# Patient Record
Sex: MALE | Race: WHITE
[De-identification: names, ages, dates, MRNs, and addresses within clinical notes are randomized per-mention and may not be internally consistent; named-entity substitution may affect disease eponyms.]

---

## 2018-03-01 ENCOUNTER — HOSPITAL ENCOUNTER (INPATIENT)
Dept: HOSPITAL 74 - JER | Age: 83
LOS: 5 days | Discharge: HOME | DRG: 638 | End: 2018-03-06
Attending: INTERNAL MEDICINE | Admitting: INTERNAL MEDICINE
Payer: COMMERCIAL

## 2018-03-01 VITALS — BODY MASS INDEX: 40.5 KG/M2

## 2018-03-01 DIAGNOSIS — Z95.1: ICD-10-CM

## 2018-03-01 DIAGNOSIS — Z95.5: ICD-10-CM

## 2018-03-01 DIAGNOSIS — N40.0: ICD-10-CM

## 2018-03-01 DIAGNOSIS — Z96.652: ICD-10-CM

## 2018-03-01 DIAGNOSIS — E11.628: Primary | ICD-10-CM

## 2018-03-01 DIAGNOSIS — E83.39: ICD-10-CM

## 2018-03-01 DIAGNOSIS — M77.52: ICD-10-CM

## 2018-03-01 DIAGNOSIS — I25.10: ICD-10-CM

## 2018-03-01 DIAGNOSIS — L03.032: ICD-10-CM

## 2018-03-01 DIAGNOSIS — E87.6: ICD-10-CM

## 2018-03-01 DIAGNOSIS — E87.2: ICD-10-CM

## 2018-03-01 DIAGNOSIS — M19.072: ICD-10-CM

## 2018-03-01 DIAGNOSIS — I10: ICD-10-CM

## 2018-03-01 LAB
ALBUMIN SERPL-MCNC: 2.9 G/DL (ref 3.4–5)
ALP SERPL-CCNC: 61 U/L (ref 45–117)
ALT SERPL-CCNC: < 6 U/L (ref 12–78)
ANION GAP SERPL CALC-SCNC: 8 MMOL/L (ref 8–16)
APPEARANCE UR: CLEAR
AST SERPL-CCNC: 13 U/L (ref 15–37)
BASOPHILS # BLD: 0.9 % (ref 0–2)
BILIRUB SERPL-MCNC: 0.4 MG/DL (ref 0.2–1)
BILIRUB UR STRIP.AUTO-MCNC: NEGATIVE MG/DL
BUN SERPL-MCNC: 19 MG/DL (ref 7–18)
CALCIUM SERPL-MCNC: 7.8 MG/DL (ref 8.5–10.1)
CHLORIDE SERPL-SCNC: 104 MMOL/L (ref 98–107)
CO2 SERPL-SCNC: 29 MMOL/L (ref 21–32)
COLOR UR: (no result)
CREAT SERPL-MCNC: 1.3 MG/DL (ref 0.7–1.3)
DEPRECATED RDW RBC AUTO: 14.1 % (ref 11.9–15.9)
EOSINOPHIL # BLD: 2.3 % (ref 0–4.5)
GLUCOSE SERPL-MCNC: 105 MG/DL (ref 74–106)
HCT VFR BLD CALC: 35.2 % (ref 35.4–49)
HGB BLD-MCNC: 12.1 GM/DL (ref 11.7–16.9)
INR BLD: 1.43 (ref 0.82–1.09)
KETONES UR QL STRIP: (no result)
LEUKOCYTE ESTERASE UR QL STRIP.AUTO: NEGATIVE
LYMPHOCYTES # BLD: 24.5 % (ref 8–40)
MCH RBC QN AUTO: 30 PG (ref 25.7–33.7)
MCHC RBC AUTO-ENTMCNC: 34.3 G/DL (ref 32–35.9)
MCV RBC: 87.4 FL (ref 80–96)
MONOCYTES # BLD AUTO: 10.5 % (ref 3.8–10.2)
NEUTROPHILS # BLD: 61.8 % (ref 42.8–82.8)
NITRITE UR QL STRIP: NEGATIVE
PH UR: 5 [PH] (ref 5–8)
PLATELET # BLD AUTO: 215 K/MM3 (ref 134–434)
PMV BLD: 8.4 FL (ref 7.5–11.1)
POTASSIUM SERPLBLD-SCNC: 3.5 MMOL/L (ref 3.5–5.1)
PROT SERPL-MCNC: 6 G/DL (ref 6.4–8.2)
PROT UR QL STRIP: NEGATIVE
PROT UR QL STRIP: NEGATIVE
PT PNL PPP: 16.2 SEC (ref 9.98–11.88)
RBC # BLD AUTO: 4.03 M/MM3 (ref 4–5.6)
RBC # UR STRIP: NEGATIVE /UL
SODIUM SERPL-SCNC: 141 MMOL/L (ref 136–145)
SP GR UR: 1.01 (ref 1–1.03)
UROBILINOGEN UR STRIP-MCNC: NEGATIVE MG/DL (ref 0.2–1)
WBC # BLD AUTO: 5.9 K/MM3 (ref 4–10)

## 2018-03-01 PROCEDURE — G0480 DRUG TEST DEF 1-7 CLASSES: HCPCS

## 2018-03-01 NOTE — PDOC
*Physical Exam





- Vital Signs


 Last Vital Signs











Temp Pulse Resp BP Pulse Ox


 


 97.2 F L  80   20   106/72   97 


 


 03/01/18 21:53  03/01/18 22:56  03/01/18 22:56  03/01/18 22:56  03/01/18 22:56














ED Treatment Course





- LABORATORY


CBC & Chemistry Diagram: 


 03/01/18 15:45





 03/01/18 15:45





- ADDITIONAL ORDERS


Additional order review: 


 Laboratory  Results











  03/01/18 03/01/18 03/01/18





  22:54 21:30 19:50


 


PT with INR    16.20 H


 


INR    1.43 H


 


Sodium   


 


Potassium   


 


Chloride   


 


Carbon Dioxide   


 


Anion Gap   


 


BUN   


 


Creatinine   


 


Creat Clearance w eGFR   


 


Random Glucose   


 


Lactic Acid  2.0  


 


Calcium   


 


Total Bilirubin   


 


AST   


 


ALT   


 


Alkaline Phosphatase   


 


Total Protein   


 


Albumin   


 


Urine Color   Ltyellow 


 


Urine Appearance   Clear 


 


Urine pH   5.0 


 


Ur Specific Gravity   1.013 


 


Urine Protein   Negative 


 


Urine Glucose (UA)   Negative 


 


Urine Ketones   Trace H 


 


Urine Blood   Negative 


 


Urine Nitrite   Negative 


 


Urine Bilirubin   Negative 


 


Urine Urobilinogen   Negative 


 


Ur Leukocyte Esterase   Negative 


 


Blood Type   


 


Antibody Screen   














  03/01/18 03/01/18 03/01/18





  15:45 15:45 15:45


 


PT with INR   


 


INR   


 


Sodium    141


 


Potassium    3.5  D


 


Chloride    104


 


Carbon Dioxide    29


 


Anion Gap    8


 


BUN    19 H D


 


Creatinine    1.3


 


Creat Clearance w eGFR    53.12


 


Random Glucose    105


 


Lactic Acid   3.1 H* 


 


Calcium    7.8 L


 


Total Bilirubin    0.4


 


AST    13 L D


 


ALT    < 6 L D


 


Alkaline Phosphatase    61


 


Total Protein    6.0 L


 


Albumin    2.9 L D


 


Urine Color   


 


Urine Appearance   


 


Urine pH   


 


Ur Specific Gravity   


 


Urine Protein   


 


Urine Glucose (UA)   


 


Urine Ketones   


 


Urine Blood   


 


Urine Nitrite   


 


Urine Bilirubin   


 


Urine Urobilinogen   


 


Ur Leukocyte Esterase   


 


Blood Type  B POSITIVE  


 


Antibody Screen  Negative  








 











  03/01/18





  15:45


 


RBC  4.03


 


MCV  87.4


 


MCHC  34.3


 


RDW  14.1


 


MPV  8.4


 


Neutrophils %  61.8


 


Lymphocytes %  24.5


 


Monocytes %  10.5 H


 


Eosinophils %  2.3


 


Basophils %  0.9














- RADIOLOGY


Radiology Studies Ordered: 














 Category Date Time Status


 


 FOOT-LEFT [RAD] Stat Radiology  03/01/18 22:19 Taken














- Medications


Given in the ED: 


ED Medications














Discontinued Medications














Generic Name Dose Route Start Last Admin





  Trade Name Freq  PRN Reason Stop Dose Admin


 


Vancomycin HCl 1,000 mg/  250 mls @ 250 mls/hr  03/01/18 21:09  03/01/18 21:45





  Dextrose  IVPB  03/01/18 22:08  250 mls/hr





  ONCE ONE   Administration





  Protocol   


 


Piperacillin/Tazobactam/Dextrose  3.375 gm  03/01/18 21:09  03/01/18 21:30





  Zosyn 3.375gm Ivpb (Premix)  IVPB  03/01/18 21:10  3.375 gm





  ONCE ONE   Administration














*DC/Admit/Observation/Transfer


Diagnosis at time of Disposition: 


 Failure of outpatient treatment





Cellulitis


Qualifiers:


 Site of cellulitis: extremity Site of cellulitis of extremity: toe Laterality: 

right Qualified Code(s): L03.031 - Cellulitis of right toe








- Discharge Dispostion


Condition at time of disposition: Stable


Admit: Yes





- Referrals


Referrals: 


Pito Brooks MD [Primary Care Provider] - 





- Patient Instructions





- Post Discharge Activity

## 2018-03-01 NOTE — PDOC
Rapid Medical Evaluation


Time Seen by Provider: 03/01/18 18:46


Medical Evaluation: 


 Allergies











Allergy/AdvReac Type Severity Reaction Status Date / Time


 


No Known Allergies Allergy   Verified 03/01/18 18:46











03/01/18 18:46


Pt c/o: rt toe cellulitis. sent by pcp for iv abx, hx diabetes


Pt on brief exam: + erythema to rt 2nd toe


Pt ordered for : iv, septic w/u


Pt to proceed to the ED





**Discharge Disposition





- Diagnosis


 Cellulitis








- Referrals





- Patient Instructions





- Post Discharge Activity

## 2018-03-01 NOTE — PN
Teaching Attending Note


Name of Resident: Saúl Pradhan





ATTENDING PHYSICIAN STATEMENT





I saw and evaluated the patient.


Chart, data, imaging reviewed. 


I reviewed the resident's note and discussed the case with the resident.


I agree with the resident's findings and plan as documented.








SUBJECTIVE:





82 year-old male with history of left knee replacement, DM, HTN, BPH, CAD s/p 

CABG sent to hospital by podiatrist for left send toe erythema and concern for 

cellulitis and to evaluate for OM. Patient was receiving a 10 day course of 

unknown PO antibiotics for cellulitis of medial right foot. Left second toe 

erythema began about 3 days ago, associated with some pain, difficulty with 

ambulation. 





OBJECTIVE:


 Last Vital Signs











Temp Pulse Resp BP Pulse Ox


 


 97.2 F L  80   20   106/72   97 


 


 03/01/18 21:53  03/01/18 22:56  03/01/18 22:56  03/01/18 22:56  03/01/18 22:56








general- aaox3, comfortable, nad 


heent- no scleral pallor or injection 


neck-supple 


cv-s1+s2+ RRR 


chest- cta b/l, no rales, rhonchi, or wheezing 


abdomen- soft, nt, BS+ 


ext- left second toe erythematous, mildly warm to touch, nontender, no ulcers 

seen on feet.  





 Abnormal Lab Results











  03/01/18 03/01/18 03/01/18





  15:45 15:45 15:45


 


Hct  35.2 L D  


 


Monocytes %  10.5 H  


 


PT with INR   


 


INR   


 


BUN   19 H D 


 


Lactic Acid    3.1 H*


 


Calcium   7.8 L 


 


AST   13 L D 


 


ALT   < 6 L D 


 


Total Protein   6.0 L 


 


Albumin   2.9 L D 


 


Urine Ketones   














  03/01/18 03/01/18





  19:50 21:30


 


Hct  


 


Monocytes %  


 


PT with INR  16.20 H 


 


INR  1.43 H 


 


BUN  


 


Lactic Acid  


 


Calcium  


 


AST  


 


ALT  


 


Total Protein  


 


Albumin  


 


Urine Ketones   Trace H








ASSESSMENT AND PLAN:


#Left second toe cellulitis with lactic acidosis and no systemic symptoms. 

Should evaluate for possible underlying OM, although less likely. Received 

vancomycin and pip/tazo in ER. 


-blood cultures x2 


-IVF hydration 


-ESR, CRP 


-repeat lactate 


-xray of left foot to evaluate for periosteal elevation 


-pain control 


-ID evaluation 


-vancomycin and pip/tazo 





-continue home medications for chronic medical problems 





-heparin sc for DVT ppx

## 2018-03-01 NOTE — PDOC
History of Present Illness





- General


Chief Complaint: Wound Infection


Stated Complaint: PCP ADMIT/CELLULITIS


Time Seen by Provider: 03/01/18 18:46


History Source: Patient, Family, Primary Care Provider


Exam Limitations: No Limitations





- History of Present Illness


Initial Comments: 





03/01/18 20:21


Patient is an 20-year-old male with history of left knee replacement, diabetes, 

hypertension, BPH, cellulitis, CAD, status post CABG, referred here for 

treatment of cellulitis to the right second toe by Dr. Larry, podiatry. 

Patient was sent for admission for IV antibiotics. He has had this redness on 

his second left toe and has been treated with antibiotics for 1 month with no 

improvement.  She has no complaints of nausea, vomiting, fever, chills, 

diarrhea.








PMD: Dr. Long


PMHX:  as above


PSHX:  left knee replacement 


PSOCHX:  Negative cig, drugs, EtOH


ALL:  NKDA








GENERAL/CONSTITUTIONAL: [No fever or chills. No weakness. No weight change.]


HEAD, EYES, EARS, NOSE AND THROAT: [No change in vision. No ear pain or 

discharge. No sore throat.]


CARDIOVASCULAR: [No chest pain or shortness of breath.]


RESPIRATORY: [No cough, wheezing, or hemoptysis.]


GASTROINTESTINAL: [No nausea, vomiting, diarrhea or constipation. No rectal 

bleeding.]


GENITOURINARY: [No dysuria, frequency, or change in urination.]


MUSCULOSKELETAL: [No joint or muscle swelling or pain. No neck or back pain.]


SKIN AND BREASTS: (+) cellulitis, (-) easy bruising.]


NEUROLOGIC: [No headache, vertigo, loss of consciousness, or loss of sensation.]


PSYCHIATRIC: [No depression or anxiety.]


ENDOCRINE: [No increased thirst. No abnormal weight change.]


HEMATOLOGIC/LYMPHATIC: [No anemia, easy bleeding, or history of blood clots.]


ALLERGIC/IMMUNOLOGIC: [No hives or skin allergy. No latex allergy.]





GENERAL: [The patient is awake, alert, and fully oriented, in no acute distress.

]


HEAD: [Normal with no signs of trauma.]


EYES: [Pupils equal, round and reactive to light, extraocular movements intact, 

sclera anicteric, conjunctiva clear.]


ENT: [Ears normal, nares patent, oropharynx clear without exudates.  Moist 

mucous membranes.]


NECK: [Normal range of motion, supple without lymphadenopathy, JVD, or masses.]


LUNGS: [Breath sounds equal, clear to auscultation bilaterally.  No wheezes, 

and no crackles.]


HEART: [Regular rate and rhythm, normal S1 and S2 without murmur, rub.]


ABDOMEN: [Soft, nontender, normoactive bowel sounds.  No guarding, no rebound.  

No masses.]


EXTREMITIES: [Normal range of motion, no edema.  No clubbing or cyanosis. No 

cords, erythema, or tenderness.]


NEUROLOGICAL: [Cranial nerves II through XII grossly intact.  Normal speech, 

normal gait.]


PSYCH: [Normal mood, normal affect.]


SKIN: [Warm, Dry, normal turgor, (+) tenderness, erythema to the left 2nd toe











Past History





- Past Medical History


Allergies/Adverse Reactions: 


 Allergies











Allergy/AdvReac Type Severity Reaction Status Date / Time


 


No Known Allergies Allergy   Verified 03/01/18 18:46











Home Medications: 


Ambulatory Orders





Furosemide [Lasix -] 100 mg PO DAILY 04/15/15 


Metoprolol Succinate [Toprol Xl -] 25 mg PO DAILY 04/15/15 


Potassium Chloride 20 meq PO DAILY 04/15/15 


Tamsulosin HCl 0.4 mg PO DAILY 04/15/15 


Aspirin 81 mg PO DAILY 03/01/18 


Atorvastatin Ca [Lipitor] 60 mg PO DAILY 03/01/18 


Carbidopa/Levodopa *Cr* 50/200 [Sinemet *Cr* 50/200 -] 1 tab PO DAILY 03/01/18 


Cyanocobalamin (Vitamin B-12) [Vitamin B-12] 1,000 mcg PO DAILY 03/01/18 


Metformin HCl 500 mg PO TID 03/01/18 


Torsemide 60 mg PO DAILY 03/01/18 








Anemia: No


Asthma: No


Cancer: No


Cardiac Disorders: Yes (CAD)


CVA: No


COPD: No


CHF: No


Dementia: No


Diabetes: No


GI Disorders: No


 Disorders: Yes (BPH)


HTN: Yes


Hypercholesterolemia: Yes


Liver Disease: No


Seizures: No


Thyroid Disease: No





- Surgical History


Cardiac Surgery: Yes (CABG 5/2010)





- Immunization History


Immunization Up to Date: Yes





- Suicide/Smoking/Psychosocial Hx


Smoking History: Never smoked


Hx Alcohol Use: No


Drug/Substance Use Hx: No


Substance Use Type: None


Hx Substance Use Treatment: No





*Physical Exam





- Vital Signs


 Last Vital Signs











Temp Pulse Resp BP Pulse Ox


 


 97.5 F L  95 H  18   120/65   98 


 


 03/01/18 18:46  03/01/18 18:46  03/01/18 18:46  03/01/18 18:46  03/01/18 18:46














ED Treatment Course





- LABORATORY


CBC & Chemistry Diagram: 


 03/01/18 15:45





 03/01/18 15:45





- ADDITIONAL ORDERS


Additional order review: 


 











  03/01/18





  15:45


 


RBC  4.03


 


MCV  87.4


 


MCHC  34.3


 


RDW  14.1


 


MPV  8.4


 


Neutrophils %  61.8


 


Lymphocytes %  24.5


 


Monocytes %  10.5 H


 


Eosinophils %  2.3


 


Basophils %  0.9














- RADIOLOGY


Radiology Studies Ordered: 














 Category Date Time Status


 


 FOOT-RIGHT [RAD] Stat Radiology  03/01/18 19:57 Ordered














Medical Decision Making





- Medical Decision Making





03/01/18 20:31


Patient is an 20-year-old male with history of left knee replacement, diabetes, 

hypertension, BPH, cellulitis, CAD, status post CABG, referred here for 

treatment of cellulitis to the right second toe by Dr. Larry, podiatry after 

treatment with antibx for 1 month.  Patient has failed po antibx





labs, xray toe


IV antibx 


admit 





03/01/18 23:24


xray foot left 2nd toe neg for osteo


cxr cardiomegaly, sternotomy wire 





labs reviewed lactic 3.1 given IVF 30cc/hr


zosyn and Vancomycin given 


repeat lactic





ekg SR rate 76, PVC, rbbb, 





d/w with hosptialist will admit











*DC/Admit/Observation/Transfer


Diagnosis at time of Disposition: 


 Failure of outpatient treatment





Cellulitis


Qualifiers:


 Site of cellulitis: extremity Site of cellulitis of extremity: toe Laterality: 

right Qualified Code(s): L03.031 - Cellulitis of right toe








- Discharge Dispostion


Condition at time of disposition: Stable


Decision to Admit order Date/Time: 





03/01/18 23:35


to medicine 








- Referrals


Referrals: 


Pito Brooks MD [Primary Care Provider] - 





- Patient Instructions





- Post Discharge Activity

## 2018-03-01 NOTE — HP
CHIEF COMPLAINT:


Left foot swelling





PCP:


Dr. Mims





HISTORY OF PRESENT ILLNESS:


The patient is an 81 yo m w/ PMH DM, HTN, CAD s/p CABG x5 sent by his 

podiatrist (Dr. Larry) to the ED for further evaluation of swelling in his 

left toe. Patient states that approx 2 weeks ago, he developed swelling and 

pain in his RIGHT foot. He went to his podiatrist, who prescribed him an 

unknown antibiotic for 10 days. The patient completed 7 days of this antibiotic 

with resolution of the pain and swelling. Today, the patient's podiatrist 

noticed swelling and erythema of his LEFT second toe and sent him to the ED for 

evaluation. Patient states that the swelling, erythema and pain in his LEFT 2nd 

toe developed suddenly approx. 3 days ago and got progressively worse, 

eventually interfering with his walking. Patient and patient's wife check his 

feet daily and states that there was no wounds prior to presentation. Patient 

denies trauma to the area. Patient denies fevers, chills, chest pain, abdominal 

pain. 





ER course was notable for:


(1) Lactic acidosis 3.1


(2) potassium 3.5


(3)





Recent Travel:


none





PAST MEDICAL HISTORY:


DM


HTN


CAD





PAST SURGICAL HISTORY:


Left Total knee replacement





Social History:


Smoking: denies


Alcohol: denies


Drugs: denies 





Family History:


non-contributory





Allergies





No Known Allergies Allergy (Verified 03/01/18 18:46)


 








HOME MEDICATIONS:


 Home Medications











 Medication  Instructions  Recorded


 


Furosemide [Lasix -] 100 mg PO DAILY 04/15/15


 


Metoprolol Succinate [Toprol Xl -] 25 mg PO DAILY 04/15/15


 


Potassium Chloride 20 meq PO DAILY 04/15/15


 


Tamsulosin HCl 0.4 mg PO DAILY 04/15/15


 


Aspirin 81 mg PO DAILY 03/01/18


 


Atorvastatin Ca [Lipitor] 60 mg PO DAILY 03/01/18


 


Carbidopa/Levodopa *Cr* 50/200 1 tab PO DAILY 03/01/18





[Sinemet *Cr* 50/200 -]  


 


Cyanocobalamin (Vitamin B-12) 1,000 mcg PO DAILY 03/01/18





[Vitamin B-12]  


 


Metformin HCl 500 mg PO TID 03/01/18


 


Torsemide 60 mg PO DAILY 03/01/18








REVIEW OF SYSTEMS


CONSTITUTIONAL: 


Absent:  fever, chills, diaphoresis, generalized weakness, malaise, loss of 

appetite, weight change


HEENT: 


Absent:  rhinorrhea, nasal congestion, throat pain, throat swelling, difficulty 

swallowing, mouth swelling, ear pain, eye pain, visual changes


CARDIOVASCULAR: 


Absent: chest pain, syncope, palpitations, irregular heart rate, lightheadedness

, peripheral edema


RESPIRATORY: 


Absent: cough, shortness of breath, dyspnea with exertion, orthopnea, wheezing, 

stridor, hemoptysis


GASTROINTESTINAL:


Absent: abdominal pain, abdominal distension, nausea, vomiting, diarrhea, 

constipation, melena, hematochezia


GENITOURINARY: 


Absent: dysuria, frequency, urgency, hesitancy, hematuria, flank pain, genital 

pain


MUSCULOSKELETAL: 


Absent: myalgia, arthralgia, back pain, neck pain


SKIN: 


Absent: rash, itching, pallor


HEMATOLOGIC/IMMUNOLOGIC: 


Absent: easy bleeding, easy bruising, lymphadenopathy, frequent infections


ENDOCRINE:


Absent: unexplained weight gain, unexplained weight loss, heat intolerance, 

cold intolerance


NEUROLOGIC: 


Absent: headache, focal weakness or paresthesias, dizziness, unsteady gait, 

seizure, mental status changes, bladder or bowel incontinence


PSYCHIATRIC: 


Absent: anxiety, depression, suicidal or homicidal ideation, hallucinations.








PHYSICAL EXAMINATION


 Vital Signs - 24 hr











  03/01/18 03/01/18 03/01/18





  18:46 21:53 22:56


 


Temperature 97.5 F L 97.2 F L 


 


Pulse Rate 95 H  


 


Pulse Rate [  70 80





Apical]   


 


Respiratory 18 20 20





Rate   


 


Blood Pressure 120/65  


 


Blood Pressure  84/57 106/72





[Right Arm]   


 


O2 Sat by Pulse 98 97 97





Oximetry (%)   











GENERAL: Awake, alert, and fully oriented, in no acute distress.


HEAD: Normal with no signs of trauma.


EYES: Pupils equal, round and reactive to light, extraocular movements intact, 

sclera anicteric, conjunctiva clear. No lid lag.


EARS, NOSE, THROAT: Ears normal, nares patent, oropharynx clear without 

exudates. Moist mucous membranes.


NECK: Normal range of motion, supple without lymphadenopathy, JVD, or masses.


LUNGS: Breath sounds equal, clear to auscultation bilaterally. No wheezes, and 

no crackles. No accessory muscle use.


HEART: Regular rate and rhythm, normal S1 and S2 without murmur, rub or gallop.


ABDOMEN: Soft, nontender, not distended, normoactive bowel sounds, no guarding, 

no rebound, no masses.  No hepatomegaly or  splenomegaly. 


LOWER EXTREMITIES: 2+ pulses, warm, well-perfused. No calf tenderness. No 

peripheral edema. 


NEUROLOGICAL:  Cranial nerves II-X intact. Normal speech. Normal gait.


PSYCHIATRIC: Cooperative. Good eye contact. Appropriate mood and affect.


SKIN: Warm, dry, normal turgor, no rashes or lesions noted, normal capillary 

refill. There is an area of erythema and swelling on the left second toe. area 

is warm and tender to palpation or manipulation of the toe. 


 Laboratory Results - last 24 hr











  03/01/18 03/01/18 03/01/18





  15:45 15:45 15:45


 


WBC  5.9  


 


RBC  4.03  


 


Hgb  12.1  D  


 


Hct  35.2 L D  


 


MCV  87.4  


 


MCH  30.0  


 


MCHC  34.3  


 


RDW  14.1  


 


Plt Count  215  D  


 


MPV  8.4  


 


Neutrophils %  61.8  


 


Lymphocytes %  24.5  


 


Monocytes %  10.5 H  


 


Eosinophils %  2.3  


 


Basophils %  0.9  


 


PT with INR   


 


INR   


 


Sodium   141 


 


Potassium   3.5  D 


 


Chloride   104 


 


Carbon Dioxide   29 


 


Anion Gap   8 


 


BUN   19 H D 


 


Creatinine   1.3 


 


Creat Clearance w eGFR   53.12 


 


Random Glucose   105 


 


Lactic Acid    3.1 H*


 


Calcium   7.8 L 


 


Total Bilirubin   0.4 


 


AST   13 L D 


 


ALT   < 6 L D 


 


Alkaline Phosphatase   61 


 


Total Protein   6.0 L 


 


Albumin   2.9 L D 


 


Urine Color   


 


Urine Appearance   


 


Urine pH   


 


Ur Specific Gravity   


 


Urine Protein   


 


Urine Glucose (UA)   


 


Urine Ketones   


 


Urine Blood   


 


Urine Nitrite   


 


Urine Bilirubin   


 


Urine Urobilinogen   


 


Ur Leukocyte Esterase   


 


Blood Type   


 


Antibody Screen   














  03/01/18 03/01/18 03/01/18





  15:45 19:50 21:30


 


WBC   


 


RBC   


 


Hgb   


 


Hct   


 


MCV   


 


MCH   


 


MCHC   


 


RDW   


 


Plt Count   


 


MPV   


 


Neutrophils %   


 


Lymphocytes %   


 


Monocytes %   


 


Eosinophils %   


 


Basophils %   


 


PT with INR   16.20 H 


 


INR   1.43 H 


 


Sodium   


 


Potassium   


 


Chloride   


 


Carbon Dioxide   


 


Anion Gap   


 


BUN   


 


Creatinine   


 


Creat Clearance w eGFR   


 


Random Glucose   


 


Lactic Acid   


 


Calcium   


 


Total Bilirubin   


 


AST   


 


ALT   


 


Alkaline Phosphatase   


 


Total Protein   


 


Albumin   


 


Urine Color    Ltyellow


 


Urine Appearance    Clear


 


Urine pH    5.0


 


Ur Specific Gravity    1.013


 


Urine Protein    Negative


 


Urine Glucose (UA)    Negative


 


Urine Ketones    Trace H


 


Urine Blood    Negative


 


Urine Nitrite    Negative


 


Urine Bilirubin    Negative


 


Urine Urobilinogen    Negative


 


Ur Leukocyte Esterase    Negative


 


Blood Type  B POSITIVE  


 


Antibody Screen  Negative  














  03/01/18





  22:54


 


WBC 


 


RBC 


 


Hgb 


 


Hct 


 


MCV 


 


MCH 


 


MCHC 


 


RDW 


 


Plt Count 


 


MPV 


 


Neutrophils % 


 


Lymphocytes % 


 


Monocytes % 


 


Eosinophils % 


 


Basophils % 


 


PT with INR 


 


INR 


 


Sodium 


 


Potassium 


 


Chloride 


 


Carbon Dioxide 


 


Anion Gap 


 


BUN 


 


Creatinine 


 


Creat Clearance w eGFR 


 


Random Glucose 


 


Lactic Acid  2.0


 


Calcium 


 


Total Bilirubin 


 


AST 


 


ALT 


 


Alkaline Phosphatase 


 


Total Protein 


 


Albumin 


 


Urine Color 


 


Urine Appearance 


 


Urine pH 


 


Ur Specific Gravity 


 


Urine Protein 


 


Urine Glucose (UA) 


 


Urine Ketones 


 


Urine Blood 


 


Urine Nitrite 


 


Urine Bilirubin 


 


Urine Urobilinogen 


 


Ur Leukocyte Esterase 


 


Blood Type 


 


Antibody Screen 











ASSESSMENT/PLAN:


The patient is an 81 yo m w/ PMH DM , HTN who is being admitted for evaluation 

of left second toe cellulitis.





#Left second toe cellulitis r/o osteomyelitis


   -s/p vanc/zosyn in ED, will continue


   -ID consult


   -f/u XR of left foot


   -ESR, CRP w/ AM labs


   -lactic acidosis to 3.1, resolved on repeat


   -blood and urine culture pending


   


#Mild hypokalemia


   -3.5


   -patient on supplementation as outpatient


   -40meq now


   -resume home replacement 


   -trend in AM


   


#diabetes


   -BGM ACHS


   -ISS ACHS


   -HbA1C





#HTN


   -resume home meds


   


#FEN


   -no fluids indicated


   -monitor lytes


   -diabetic diet





#Prophylaxis


   -Hep SQ TID





#dispo


   -admit to med surg





Visit type





- Emergency Visit


Emergency Visit: Yes


ED Registration Date: 03/02/18


Care time: The patient presented to the Emergency Department on the above date 

and was hospitalized for further evaluation of their emergent condition.





- New Patient


This patient is new to me today: Yes


Date on this admission: 03/02/18





- Critical Care


Critical Care patient: No





Hospitalist Screening





- Colonoscopy Questionnaire


Colonoscopy Questionnaire: 





Colonoscopy Questionnaire








-   Patient:


50 - 75 years old and never had a screening colonoscopy: Unknown


History of colon or rectal polyps, or CA: Unknown


History of IBD, Crohn's disease or UC: Unknown


History of abdominal radiation therapy as a child: Unknown





-   Relative:


1 with colon or rectal CA, or polyps at age 60 or younger: Unknown


Colon or rectal CA diagnosed at age 45 or younger: Unknown


Multiple relatives with colon or rectal CA: Unknown





-   Outcome:


Screening Result: Negative Screen

## 2018-03-01 NOTE — PDOC
*Physical Exam





- Vital Signs


 Last Vital Signs











Temp Pulse Resp BP Pulse Ox


 


 97.5 F L  95 H  18   120/65   98 


 


 03/01/18 18:46  03/01/18 18:46  03/01/18 18:46  03/01/18 18:46  03/01/18 18:46














ED Treatment Course





- LABORATORY


CBC & Chemistry Diagram: 


 03/01/18 15:45





 03/01/18 15:45





- ADDITIONAL ORDERS


Additional order review: 


 Laboratory  Results











  03/01/18 03/01/18 03/01/18





  19:50 15:45 15:45


 


PT with INR  16.20 H  


 


INR  1.43 H  


 


Sodium    141


 


Potassium    3.5  D


 


Chloride    104


 


Carbon Dioxide    29


 


Anion Gap    8


 


BUN    19 H D


 


Creatinine    1.3


 


Creat Clearance w eGFR    53.12


 


Random Glucose    105


 


Lactic Acid   3.1 H* 


 


Calcium    7.8 L


 


Total Bilirubin    0.4


 


AST    13 L D


 


ALT    < 6 L D


 


Alkaline Phosphatase    61


 


Total Protein    6.0 L


 


Albumin    2.9 L D








 











  03/01/18





  15:45


 


RBC  4.03


 


MCV  87.4


 


MCHC  34.3


 


RDW  14.1


 


MPV  8.4


 


Neutrophils %  61.8


 


Lymphocytes %  24.5


 


Monocytes %  10.5 H


 


Eosinophils %  2.3


 


Basophils %  0.9














Medical Decision Making





- Medical Decision Making





03/01/18 21:21


The patient was seen and evaluated in conjunction with SUSI Shah under my 

direct supervision, ancillary studies were reviewed.  I independently 

interviewed and evaluated the patient and I agree with the plan as outlined by 

SUSI Wagner. 





*DC/Admit/Observation/Transfer


Diagnosis at time of Disposition: 


Cellulitis


Qualifiers:


 Site of cellulitis: extremity Site of cellulitis of extremity: toe Laterality: 

right Qualified Code(s): L03.031 - Cellulitis of right toe








- Referrals


Referrals: 


Pito Brooks MD [Primary Care Provider] - 





- Patient Instructions





- Post Discharge Activity

## 2018-03-02 LAB
ALBUMIN SERPL-MCNC: 2.7 G/DL (ref 3.4–5)
ALP SERPL-CCNC: 47 U/L (ref 45–117)
ALT SERPL-CCNC: 10 U/L (ref 12–78)
ANION GAP SERPL CALC-SCNC: 6 MMOL/L (ref 8–16)
AST SERPL-CCNC: 12 U/L (ref 15–37)
BASOPHILS # BLD: 0.7 % (ref 0–2)
BILIRUB SERPL-MCNC: 0.6 MG/DL (ref 0.2–1)
BUN SERPL-MCNC: 14 MG/DL (ref 7–18)
CALCIUM SERPL-MCNC: 7.5 MG/DL (ref 8.5–10.1)
CHLORIDE SERPL-SCNC: 107 MMOL/L (ref 98–107)
CO2 SERPL-SCNC: 29 MMOL/L (ref 21–32)
CREAT SERPL-MCNC: 0.9 MG/DL (ref 0.7–1.3)
DEPRECATED RDW RBC AUTO: 13.9 % (ref 11.9–15.9)
EOSINOPHIL # BLD: 2.5 % (ref 0–4.5)
GLUCOSE SERPL-MCNC: 90 MG/DL (ref 74–106)
HCT VFR BLD CALC: 34.1 % (ref 35.4–49)
HGB BLD-MCNC: 11.7 GM/DL (ref 11.7–16.9)
INR BLD: 1.49 (ref 0.82–1.09)
LYMPHOCYTES # BLD: 23.9 % (ref 8–40)
MAGNESIUM SERPL-MCNC: 1.8 MG/DL (ref 1.8–2.4)
MCH RBC QN AUTO: 29.9 PG (ref 25.7–33.7)
MCHC RBC AUTO-ENTMCNC: 34.3 G/DL (ref 32–35.9)
MCV RBC: 87.2 FL (ref 80–96)
MONOCYTES # BLD AUTO: 9.5 % (ref 3.8–10.2)
NEUTROPHILS # BLD: 63.4 % (ref 42.8–82.8)
PHOSPHATE SERPL-MCNC: 2.2 MG/DL (ref 2.5–4.9)
PLATELET # BLD AUTO: 187 K/MM3 (ref 134–434)
PMV BLD: 8 FL (ref 7.5–11.1)
POTASSIUM SERPLBLD-SCNC: 3.6 MMOL/L (ref 3.5–5.1)
PROT SERPL-MCNC: 5.3 G/DL (ref 6.4–8.2)
PT PNL PPP: 16.8 SEC (ref 9.98–11.88)
RBC # BLD AUTO: 3.91 M/MM3 (ref 4–5.6)
SODIUM SERPL-SCNC: 142 MMOL/L (ref 136–145)
WBC # BLD AUTO: 6.3 K/MM3 (ref 4–10)

## 2018-03-02 RX ADMIN — INSULIN ASPART SCH: 100 INJECTION, SOLUTION INTRAVENOUS; SUBCUTANEOUS at 11:38

## 2018-03-02 RX ADMIN — INSULIN ASPART SCH: 100 INJECTION, SOLUTION INTRAVENOUS; SUBCUTANEOUS at 06:07

## 2018-03-02 RX ADMIN — INSULIN ASPART SCH: 100 INJECTION, SOLUTION INTRAVENOUS; SUBCUTANEOUS at 22:10

## 2018-03-02 RX ADMIN — INSULIN ASPART SCH: 100 INJECTION, SOLUTION INTRAVENOUS; SUBCUTANEOUS at 17:17

## 2018-03-02 RX ADMIN — DONEPEZIL HYDROCHLORIDE SCH MG: 10 TABLET, FILM COATED ORAL at 17:53

## 2018-03-02 RX ADMIN — ASPIRIN 81 MG SCH MG: 81 TABLET ORAL at 09:58

## 2018-03-02 RX ADMIN — TAMSULOSIN HYDROCHLORIDE SCH MG: 0.4 CAPSULE ORAL at 09:58

## 2018-03-02 RX ADMIN — ATORVASTATIN CALCIUM SCH MG: 40 TABLET, FILM COATED ORAL at 22:10

## 2018-03-02 RX ADMIN — HEPARIN SODIUM SCH UNIT: 5000 INJECTION, SOLUTION INTRAVENOUS; SUBCUTANEOUS at 05:30

## 2018-03-02 RX ADMIN — HEPARIN SODIUM SCH UNIT: 5000 INJECTION, SOLUTION INTRAVENOUS; SUBCUTANEOUS at 13:47

## 2018-03-02 RX ADMIN — POTASSIUM CHLORIDE SCH MEQ: 1500 TABLET, EXTENDED RELEASE ORAL at 09:59

## 2018-03-02 RX ADMIN — HEPARIN SODIUM SCH UNIT: 5000 INJECTION, SOLUTION INTRAVENOUS; SUBCUTANEOUS at 22:10

## 2018-03-02 RX ADMIN — PIPERACILLIN SODIUM,TAZOBACTAM SODIUM SCH MLS/HR: 3; .375 INJECTION, POWDER, FOR SOLUTION INTRAVENOUS at 17:52

## 2018-03-02 NOTE — EKG
Test Reason : 

Blood Pressure : ***/*** mmHG

Vent. Rate : 076 BPM     Atrial Rate : 076 BPM

   P-R Int : 176 ms          QRS Dur : 152 ms

    QT Int : 506 ms       P-R-T Axes : 015 -15 -18 degrees

   QTc Int : 569 ms

 

SINUS RHYTHM WITH FREQUENT PREMATURE VENTRICULAR COMPLEXES

RIGHT BUNDLE BRANCH BLOCK

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

T WAVE ABNORMALITY, CONSIDER LATERAL ISCHEMIA

ABNORMAL ECG

WHEN COMPARED WITH ECG OF 25-MAR-2015 12:28,

PREMATURE VENTRICULAR COMPLEXES ARE NOW PRESENT

QT HAS LENGTHENED

Confirmed by BENJAMIN PRITCHETT MD ([id number]) on 3/2/2018 10:03:44 AM

 

Referred By:             Confirmed By:BENJAMIN PRITCHETT MD

## 2018-03-02 NOTE — PN
Physical Exam: 


SUBJECTIVE: Patient seen and examined.  Pt c/o pain to dorsum of Left foot.  

Otherwise, pt has no c/o.  Pt denies chest pain, sob, abdominal pain, fever, 

chills, nausea, vomiting, constipation, diarrhea. 





OBJECTIVE:





 Vital Signs











 Period  Temp  Pulse  Resp  BP Sys/Lowe  Pulse Ox


 


 Last 24 Hr  97.2 F-98.0 F  70-95  18-20  /55-74  97-99








GENERAL: The patient is awake, alert, and fully oriented, in no acute distress.


LUNGS: Breath sounds equal, clear to auscultation bilaterally, no wheezes, no 

crackles, no accessory muscle use. 


HEART: Regular rate and rhythm, S1, S2 without murmur, rub or gallop.


ABDOMEN: Soft, nontender, nondistended, normoactive bowel sounds, no guarding.


EXTREMITIES: 1+ jillian pulses, warm, well-perfused, no edema.  Left 2nd toe 

erythematous and swollen at Proximal Interphalangeal joint.  Dorsum of Left 

foot quite tender to palpation with 3cm possible abscess/circular swelling.


PSYCH: Normal mood, normal affect.





 Laboratory Results - last 24 hr











  03/01/18 03/01/18 03/01/18





  15:45 15:45 15:45


 


WBC  5.9  


 


RBC  4.03  


 


Hgb  12.1  D  


 


Hct  35.2 L D  


 


MCV  87.4  


 


MCH  30.0  


 


MCHC  34.3  


 


RDW  14.1  


 


Plt Count  215  D  


 


MPV  8.4  


 


Neutrophils %  61.8  


 


Lymphocytes %  24.5  


 


Monocytes %  10.5 H  


 


Eosinophils %  2.3  


 


Basophils %  0.9  


 


ESR   


 


PT with INR   


 


INR   


 


Sodium   141 


 


Potassium   3.5  D 


 


Chloride   104 


 


Carbon Dioxide   29 


 


Anion Gap   8 


 


BUN   19 H D 


 


Creatinine   1.3 


 


Creat Clearance w eGFR   53.12 


 


POC Glucometer   


 


Random Glucose   105 


 


Hemoglobin A1c %   


 


Lactic Acid    3.1 H*


 


Calcium   7.8 L 


 


Phosphorus   


 


Magnesium   


 


Total Bilirubin   0.4 


 


AST   13 L D 


 


ALT   < 6 L D 


 


Alkaline Phosphatase   61 


 


C-Reactive Protein   


 


Total Protein   6.0 L 


 


Albumin   2.9 L D 


 


Urine Color   


 


Urine Appearance   


 


Urine pH   


 


Ur Specific Gravity   


 


Urine Protein   


 


Urine Glucose (UA)   


 


Urine Ketones   


 


Urine Blood   


 


Urine Nitrite   


 


Urine Bilirubin   


 


Urine Urobilinogen   


 


Ur Leukocyte Esterase   


 


Blood Type   


 


Antibody Screen   














  03/01/18 03/01/18 03/01/18





  15:45 19:50 21:30


 


WBC   


 


RBC   


 


Hgb   


 


Hct   


 


MCV   


 


MCH   


 


MCHC   


 


RDW   


 


Plt Count   


 


MPV   


 


Neutrophils %   


 


Lymphocytes %   


 


Monocytes %   


 


Eosinophils %   


 


Basophils %   


 


ESR   


 


PT with INR   16.20 H 


 


INR   1.43 H 


 


Sodium   


 


Potassium   


 


Chloride   


 


Carbon Dioxide   


 


Anion Gap   


 


BUN   


 


Creatinine   


 


Creat Clearance w eGFR   


 


POC Glucometer   


 


Random Glucose   


 


Hemoglobin A1c %   


 


Lactic Acid   


 


Calcium   


 


Phosphorus   


 


Magnesium   


 


Total Bilirubin   


 


AST   


 


ALT   


 


Alkaline Phosphatase   


 


C-Reactive Protein   


 


Total Protein   


 


Albumin   


 


Urine Color    Ltyellow


 


Urine Appearance    Clear


 


Urine pH    5.0


 


Ur Specific Gravity    1.013


 


Urine Protein    Negative


 


Urine Glucose (UA)    Negative


 


Urine Ketones    Trace H


 


Urine Blood    Negative


 


Urine Nitrite    Negative


 


Urine Bilirubin    Negative


 


Urine Urobilinogen    Negative


 


Ur Leukocyte Esterase    Negative


 


Blood Type  B POSITIVE  


 


Antibody Screen  Negative  














  03/01/18 03/02/18 03/02/18





  22:54 05:25 06:40


 


WBC   


 


RBC   


 


Hgb   


 


Hct   


 


MCV   


 


MCH   


 


MCHC   


 


RDW   


 


Plt Count   


 


MPV   


 


Neutrophils %   


 


Lymphocytes %   


 


Monocytes %   


 


Eosinophils %   


 


Basophils %   


 


ESR   


 


PT with INR   


 


INR   


 


Sodium    142


 


Potassium    3.6


 


Chloride    107


 


Carbon Dioxide    29


 


Anion Gap    6 L


 


BUN    14  D


 


Creatinine    0.9  D


 


Creat Clearance w eGFR    > 60


 


POC Glucometer   90 


 


Random Glucose    90


 


Hemoglobin A1c %   


 


Lactic Acid  2.0  


 


Calcium    7.5 L


 


Phosphorus    2.2 L


 


Magnesium    1.8


 


Total Bilirubin    0.6  D


 


AST    12 L


 


ALT    10 L D


 


Alkaline Phosphatase    47  D


 


C-Reactive Protein    0.9 H


 


Total Protein    5.3 L


 


Albumin    2.7 L


 


Urine Color   


 


Urine Appearance   


 


Urine pH   


 


Ur Specific Gravity   


 


Urine Protein   


 


Urine Glucose (UA)   


 


Urine Ketones   


 


Urine Blood   


 


Urine Nitrite   


 


Urine Bilirubin   


 


Urine Urobilinogen   


 


Ur Leukocyte Esterase   


 


Blood Type   


 


Antibody Screen   














  03/02/18 03/02/18 03/02/18





  06:40 06:40 06:40


 


WBC    6.3


 


RBC    3.91 L


 


Hgb    11.7


 


Hct    34.1 L


 


MCV    87.2


 


MCH    29.9


 


MCHC    34.3


 


RDW    13.9


 


Plt Count    187


 


MPV    8.0


 


Neutrophils %    63.4


 


Lymphocytes %    23.9


 


Monocytes %    9.5


 


Eosinophils %    2.5


 


Basophils %    0.7


 


ESR  24 H  


 


PT with INR   


 


INR   


 


Sodium   


 


Potassium   


 


Chloride   


 


Carbon Dioxide   


 


Anion Gap   


 


BUN   


 


Creatinine   


 


Creat Clearance w eGFR   


 


POC Glucometer   


 


Random Glucose   


 


Hemoglobin A1c %   6.2 H 


 


Lactic Acid   


 


Calcium   


 


Phosphorus   


 


Magnesium   


 


Total Bilirubin   


 


AST   


 


ALT   


 


Alkaline Phosphatase   


 


C-Reactive Protein   


 


Total Protein   


 


Albumin   


 


Urine Color   


 


Urine Appearance   


 


Urine pH   


 


Ur Specific Gravity   


 


Urine Protein   


 


Urine Glucose (UA)   


 


Urine Ketones   


 


Urine Blood   


 


Urine Nitrite   


 


Urine Bilirubin   


 


Urine Urobilinogen   


 


Ur Leukocyte Esterase   


 


Blood Type   


 


Antibody Screen   














  03/02/18 03/02/18





  06:40 11:37


 


WBC  


 


RBC  


 


Hgb  


 


Hct  


 


MCV  


 


MCH  


 


MCHC  


 


RDW  


 


Plt Count  


 


MPV  


 


Neutrophils %  


 


Lymphocytes %  


 


Monocytes %  


 


Eosinophils %  


 


Basophils %  


 


ESR  


 


PT with INR  16.80 H 


 


INR  1.49 H 


 


Sodium  


 


Potassium  


 


Chloride  


 


Carbon Dioxide  


 


Anion Gap  


 


BUN  


 


Creatinine  


 


Creat Clearance w eGFR  


 


POC Glucometer   107


 


Random Glucose  


 


Hemoglobin A1c %  


 


Lactic Acid  


 


Calcium  


 


Phosphorus  


 


Magnesium  


 


Total Bilirubin  


 


AST  


 


ALT  


 


Alkaline Phosphatase  


 


C-Reactive Protein  


 


Total Protein  


 


Albumin  


 


Urine Color  


 


Urine Appearance  


 


Urine pH  


 


Ur Specific Gravity  


 


Urine Protein  


 


Urine Glucose (UA)  


 


Urine Ketones  


 


Urine Blood  


 


Urine Nitrite  


 


Urine Bilirubin  


 


Urine Urobilinogen  


 


Ur Leukocyte Esterase  


 


Blood Type  


 


Antibody Screen  








Active Medications











Generic Name Dose Route Start Last Admin





  Trade Name Freq  PRN Reason Stop Dose Admin


 


Aspirin  81 mg  03/02/18 10:00  03/02/18 09:58





  Asa -  PO   81 mg





  DAILY MITCHELL   Administration


 


Atorvastatin Calcium  60 mg  03/02/18 22:00  





  Lipitor -  PO   





  HS MITCHELL   


 


Carbidopa/Levodopa  1 combo  03/02/18 10:00  03/02/18 11:44





  Sinemet *Cr* 50/200 -  PO   1 combo





  DAILY MITCHELL   Administration


 


Heparin Sodium (Porcine)  5,000 unit  03/02/18 06:00  03/02/18 13:47





  Heparin -  SQ   5,000 unit





  TID MITCHELL   Administration


 


Potassium Phosphate 30 mm/  510 mls @ 62.5 mls/hr  03/02/18 08:45  03/02/18 10:

01





  Sodium Chloride  IVPB  03/02/18 16:54  62.5 mls/hr





  ONCE ONE   Administration


 


Vancomycin HCl 1,000 mg/  250 mls @ 250 mls/hr  03/02/18 14:30  





  Dextrose  IVPB   





  Q12H MITCHELL   





  Protocol   


 


Piperacillin Sod/Tazobactam  50 mls @ 100 mls/hr  03/02/18 18:00  





  Sod 3.375 gm/ Dextrose  IVPB   





  Q8H-IV MITCHELL   





  Protocol   


 


Insulin Aspart  1 vial  03/02/18 07:00  03/02/18 11:38





  Novolog Vial Sliding Scale -  SQ   Not Given





  [facility] MITCHELL   





  Protocol   


 


Metoprolol Succinate  25 mg  03/02/18 10:00  03/02/18 10:00





  Toprol Xl -  PO   25 mg





  DAILY MITCHELL   Administration


 


Potassium Chloride  20 meq  03/02/18 10:00  03/02/18 09:59





  K-Dur -  PO   20 meq





  DAILY MITCHELL   Administration


 


Tamsulosin HCl  0.4 mg  03/02/18 08:30  03/02/18 09:58





  Flomax -  PO   0.4 mg





  DAILY@0830 MITCHELL   Administration


 


Torsemide  60 mg  03/02/18 10:00  03/02/18 09:58





  Demadex -  PO   60 mg





  DAILY MITCHELL   Administration








IMAGING:


3/1/18 CXR -> no acute chest pathology


3/1/18 Left foot xray -> arthritic changes, no acute fracture or destructive 

bony process noted.





ASSESSMENT/PLAN:


82M with PMH of DM, htn, CAD, presents with swelling in dorsum of Left foot and 

to Left 2nd toe, admitted for Left diabetic foot.





# Left diabetic foot with Left 2nd toe cellulitis - ?abscess to Left 2nd toe and

/or to dorsum of Left foot


   - Podiatry Consult


   - Surgery Consult


   - ID (Dr. Haley) recs appreciated: empiric IV Vancomycin and IV Zosyn Day 2


   - f/u blood and urine cultures





# NIDDM


   - BGMs


   - SSI


   - hgba1c 6.2, suggesting DM is well controlled 


   - hold home med of Metformin





# CAD


   - continue home meds of ASA and Lipitor





# htn


   - continue home meds of Toprol XL and Torsemide





# hypophosphatemia


   - repleted with KPhos IVPB 30 mm





# FEN 


   - Fluids: po


   - Electrolytes: hypophosphatemia noted, continue to monitor


   - Nutrition: diabetic diet 





# Prophylaxis


   - DVT ppx with Heparin TID


   - deconditioning ppx with PT





Visit type





- Emergency Visit


Emergency Visit: Yes


ED Registration Date: 03/02/18


Care time: The patient presented to the Emergency Department on the above date 

and was hospitalized for further evaluation of their emergent condition.





- New Patient


This patient is new to me today: Yes


Date on this admission: 03/02/18





- Critical Care


Critical Care patient: No

## 2018-03-02 NOTE — PN
Progress Note (short form)





- Note


Progress Note: 





ID Consult dictated


Cellulitis L 2nd toe


? Soft tissue abscess, dorsum 2nd toe


Diabetes mellitus


Await c/s


Surgical evaluation


Empiric vancomycin/ zosyn

## 2018-03-02 NOTE — PN
Teaching Attending Note


Name of Resident: Celine Shay





ATTENDING PHYSICIAN STATEMENT


Time of evaluation: 10:20 AM


I saw and evaluated the patient.


I reviewed the resident's note and discussed the case with the resident.


I agree with the resident's findings and plan as documented.








SUBJECTIVE:


Patient seen and examined. still with left toe and foot pain, no other 

complaints, no new fevers, chills. 





OBJECTIVE:


 Vital Signs











 Period  Temp  Pulse  Resp  BP Sys/Lowe  Pulse Ox


 


 Last 24 Hr  97.2 F-98.0 F  70-95  18-20  /55-74  97-99








 Intake & Output











 02/27/18 02/28/18 03/01/18 03/02/18





 23:59 23:59 23:59 23:59


 


Intake Total   2300 700


 


Output Total   300 250


 


Balance   2000 450


 


Weight   237 lb 243 lb 8 oz








General: sitting in bed in no acute distress


Extremiteis: left second toe 1 cm circular red warm tendern swelling over dorsum

, Also 2 cm circular swelling with tenderness but no warmth or erythema over 

dorsum left foot, positive DP plulses. 


No swelling or erythema RLE





 Home Medication List











 Medication  Instructions  Recorded  Confirmed  Type


 


Furosemide [Lasix -] 100 mg PO DAILY 04/15/15 03/01/18 History


 


Metoprolol Succinate [Toprol Xl -] 25 mg PO DAILY 04/15/15 03/01/18 History


 


Potassium Chloride 20 meq PO DAILY 04/15/15 03/01/18 History


 


Tamsulosin HCl 0.4 mg PO DAILY 04/15/15 03/01/18 History


 


Aspirin 81 mg PO DAILY 03/01/18 03/01/18 History


 


Atorvastatin Ca [Lipitor] 60 mg PO DAILY 03/01/18 03/01/18 History


 


Carbidopa/Levodopa *Cr* 50/200 1 tab PO DAILY 03/01/18 03/01/18 History





[Sinemet *Cr* 50/200 -]    


 


Cyanocobalamin (Vitamin B-12) 1,000 mcg PO DAILY 03/01/18 03/01/18 History





[Vitamin B-12]    


 


Metformin HCl 500 mg PO TID 03/01/18 03/01/18 History


 


Torsemide 60 mg PO DAILY 03/01/18 03/01/18 History








 Active Medications











Generic Name Dose Route Start Last Admin





  Trade Name Freq  PRN Reason Stop Dose Admin


 


Aspirin  81 mg  03/02/18 10:00  03/02/18 09:58





  Asa -  PO   81 mg





  DAILY MITCHELL   Administration


 


Atorvastatin Calcium  60 mg  03/02/18 22:00  





  Lipitor -  PO   





  HS MITCHELL   


 


Carbidopa/Levodopa  1 combo  03/02/18 10:00  03/02/18 11:44





  Sinemet *Cr* 50/200 -  PO   1 combo





  DAILY MITCHELL   Administration


 


Heparin Sodium (Porcine)  5,000 unit  03/02/18 06:00  03/02/18 13:47





  Heparin -  SQ   5,000 unit





  TID MITCHELL   Administration


 


Potassium Phosphate 30 mm/  510 mls @ 62.5 mls/hr  03/02/18 08:45  03/02/18 10:

01





  Sodium Chloride  IVPB  03/02/18 16:54  62.5 mls/hr





  ONCE ONE   Administration


 


Vancomycin HCl 1,000 mg/  250 mls @ 250 mls/hr  03/02/18 14:30  





  Dextrose  IVPB   





  Q12H [facility]   





  Protocol   


 


Piperacillin Sod/Tazobactam  50 mls @ 100 mls/hr  03/02/18 18:00  





  Sod 3.375 gm/ Dextrose  IVPB   





  Q8H-IV [facility]   





  Protocol   


 


Insulin Aspart  1 vial  03/02/18 07:00  03/02/18 11:38





  Novolog Vial Sliding Scale -  SQ   Not Given





  [facility]   





  Protocol   


 


Metoprolol Succinate  25 mg  03/02/18 10:00  03/02/18 10:00





  Toprol Xl -  PO   25 mg





  DAILY MITCHELL   Administration


 


Potassium Chloride  20 meq  03/02/18 10:00  03/02/18 09:59





  K-Dur -  PO   20 meq





  DAILY MITCHELL   Administration


 


Tamsulosin HCl  0.4 mg  03/02/18 08:30  03/02/18 09:58





  Flomax -  PO   0.4 mg





  DAILY@0830 MITCHELL   Administration


 


Torsemide  60 mg  03/02/18 10:00  03/02/18 09:58





  Demadex -  PO   60 mg





  DAILY MITCHELL   Administration








 Laboratory Results - last 24 hr











  03/01/18 03/01/18 03/01/18





  15:45 15:45 15:45


 


WBC  5.9  


 


RBC  4.03  


 


Hgb  12.1  D  


 


Hct  35.2 L D  


 


MCV  87.4  


 


MCH  30.0  


 


MCHC  34.3  


 


RDW  14.1  


 


Plt Count  215  D  


 


MPV  8.4  


 


Neutrophils %  61.8  


 


Lymphocytes %  24.5  


 


Monocytes %  10.5 H  


 


Eosinophils %  2.3  


 


Basophils %  0.9  


 


ESR   


 


PT with INR   


 


INR   


 


Sodium   141 


 


Potassium   3.5  D 


 


Chloride   104 


 


Carbon Dioxide   29 


 


Anion Gap   8 


 


BUN   19 H D 


 


Creatinine   1.3 


 


Creat Clearance w eGFR   53.12 


 


POC Glucometer   


 


Random Glucose   105 


 


Hemoglobin A1c %   


 


Lactic Acid    3.1 H*


 


Calcium   7.8 L 


 


Phosphorus   


 


Magnesium   


 


Total Bilirubin   0.4 


 


AST   13 L D 


 


ALT   < 6 L D 


 


Alkaline Phosphatase   61 


 


C-Reactive Protein   


 


Total Protein   6.0 L 


 


Albumin   2.9 L D 


 


Urine Color   


 


Urine Appearance   


 


Urine pH   


 


Ur Specific Gravity   


 


Urine Protein   


 


Urine Glucose (UA)   


 


Urine Ketones   


 


Urine Blood   


 


Urine Nitrite   


 


Urine Bilirubin   


 


Urine Urobilinogen   


 


Ur Leukocyte Esterase   


 


Blood Type   


 


Antibody Screen   














  03/01/18 03/01/18 03/01/18





  15:45 19:50 21:30


 


WBC   


 


RBC   


 


Hgb   


 


Hct   


 


MCV   


 


MCH   


 


MCHC   


 


RDW   


 


Plt Count   


 


MPV   


 


Neutrophils %   


 


Lymphocytes %   


 


Monocytes %   


 


Eosinophils %   


 


Basophils %   


 


ESR   


 


PT with INR   16.20 H 


 


INR   1.43 H 


 


Sodium   


 


Potassium   


 


Chloride   


 


Carbon Dioxide   


 


Anion Gap   


 


BUN   


 


Creatinine   


 


Creat Clearance w eGFR   


 


POC Glucometer   


 


Random Glucose   


 


Hemoglobin A1c %   


 


Lactic Acid   


 


Calcium   


 


Phosphorus   


 


Magnesium   


 


Total Bilirubin   


 


AST   


 


ALT   


 


Alkaline Phosphatase   


 


C-Reactive Protein   


 


Total Protein   


 


Albumin   


 


Urine Color    Ltyellow


 


Urine Appearance    Clear


 


Urine pH    5.0


 


Ur Specific Gravity    1.013


 


Urine Protein    Negative


 


Urine Glucose (UA)    Negative


 


Urine Ketones    Trace H


 


Urine Blood    Negative


 


Urine Nitrite    Negative


 


Urine Bilirubin    Negative


 


Urine Urobilinogen    Negative


 


Ur Leukocyte Esterase    Negative


 


Blood Type  B POSITIVE  


 


Antibody Screen  Negative  














  03/01/18 03/02/18 03/02/18





  22:54 05:25 06:40


 


WBC   


 


RBC   


 


Hgb   


 


Hct   


 


MCV   


 


MCH   


 


MCHC   


 


RDW   


 


Plt Count   


 


MPV   


 


Neutrophils %   


 


Lymphocytes %   


 


Monocytes %   


 


Eosinophils %   


 


Basophils %   


 


ESR   


 


PT with INR   


 


INR   


 


Sodium    142


 


Potassium    3.6


 


Chloride    107


 


Carbon Dioxide    29


 


Anion Gap    6 L


 


BUN    14  D


 


Creatinine    0.9  D


 


Creat Clearance w eGFR    > 60


 


POC Glucometer   90 


 


Random Glucose    90


 


Hemoglobin A1c %   


 


Lactic Acid  2.0  


 


Calcium    7.5 L


 


Phosphorus    2.2 L


 


Magnesium    1.8


 


Total Bilirubin    0.6  D


 


AST    12 L


 


ALT    10 L D


 


Alkaline Phosphatase    47  D


 


C-Reactive Protein    0.9 H


 


Total Protein    5.3 L


 


Albumin    2.7 L


 


Urine Color   


 


Urine Appearance   


 


Urine pH   


 


Ur Specific Gravity   


 


Urine Protein   


 


Urine Glucose (UA)   


 


Urine Ketones   


 


Urine Blood   


 


Urine Nitrite   


 


Urine Bilirubin   


 


Urine Urobilinogen   


 


Ur Leukocyte Esterase   


 


Blood Type   


 


Antibody Screen   














  03/02/18 03/02/18 03/02/18





  06:40 06:40 06:40


 


WBC    6.3


 


RBC    3.91 L


 


Hgb    11.7


 


Hct    34.1 L


 


MCV    87.2


 


MCH    29.9


 


MCHC    34.3


 


RDW    13.9


 


Plt Count    187


 


MPV    8.0


 


Neutrophils %    63.4


 


Lymphocytes %    23.9


 


Monocytes %    9.5


 


Eosinophils %    2.5


 


Basophils %    0.7


 


ESR  24 H  


 


PT with INR   


 


INR   


 


Sodium   


 


Potassium   


 


Chloride   


 


Carbon Dioxide   


 


Anion Gap   


 


BUN   


 


Creatinine   


 


Creat Clearance w eGFR   


 


POC Glucometer   


 


Random Glucose   


 


Hemoglobin A1c %   6.2 H 


 


Lactic Acid   


 


Calcium   


 


Phosphorus   


 


Magnesium   


 


Total Bilirubin   


 


AST   


 


ALT   


 


Alkaline Phosphatase   


 


C-Reactive Protein   


 


Total Protein   


 


Albumin   


 


Urine Color   


 


Urine Appearance   


 


Urine pH   


 


Ur Specific Gravity   


 


Urine Protein   


 


Urine Glucose (UA)   


 


Urine Ketones   


 


Urine Blood   


 


Urine Nitrite   


 


Urine Bilirubin   


 


Urine Urobilinogen   


 


Ur Leukocyte Esterase   


 


Blood Type   


 


Antibody Screen   














  03/02/18 03/02/18





  06:40 11:37


 


WBC  


 


RBC  


 


Hgb  


 


Hct  


 


MCV  


 


MCH  


 


MCHC  


 


RDW  


 


Plt Count  


 


MPV  


 


Neutrophils %  


 


Lymphocytes %  


 


Monocytes %  


 


Eosinophils %  


 


Basophils %  


 


ESR  


 


PT with INR  16.80 H 


 


INR  1.49 H 


 


Sodium  


 


Potassium  


 


Chloride  


 


Carbon Dioxide  


 


Anion Gap  


 


BUN  


 


Creatinine  


 


Creat Clearance w eGFR  


 


POC Glucometer   107


 


Random Glucose  


 


Hemoglobin A1c %  


 


Lactic Acid  


 


Calcium  


 


Phosphorus  


 


Magnesium  


 


Total Bilirubin  


 


AST  


 


ALT  


 


Alkaline Phosphatase  


 


C-Reactive Protein  


 


Total Protein  


 


Albumin  


 


Urine Color  


 


Urine Appearance  


 


Urine pH  


 


Ur Specific Gravity  


 


Urine Protein  


 


Urine Glucose (UA)  


 


Urine Ketones  


 


Urine Blood  


 


Urine Nitrite  


 


Urine Bilirubin  


 


Urine Urobilinogen  


 


Ur Leukocyte Esterase  


 


Blood Type  


 


Antibody Screen  














ASSESSMENT AND PLAN:


82 yom with HTN, DM, CAD s/p CABG/5 stents, ?CHF admitted with left diabetic 

foot





-Left diabetic foot with left second toe cellulitis+/- abscess and ?abscess of 

dorsum left foot


-Lactic acidosis, resolved


-CAD s/p CABG/PCI, ?CHF


-HTN


-NIDDM





Plan:


Zosyn/vancomycin. ID input appreciated. 


?abscess left second toe/dorsum left foot


Podiatry consult. 


Hold off additional IVF for now. lactic acidosis resolved. 


Torsemide, K, phos supplementation. 


Hold metformin, ISS, diabetic diet. 


Continue ASA/statin/metoprolol. 


DVTPPx


dispo pending surgical plans and antibiotic needs. 


Plan discussed with patient in detail, all questions answered.

## 2018-03-03 LAB
ANION GAP SERPL CALC-SCNC: 13 MMOL/L (ref 8–16)
BUN SERPL-MCNC: 9 MG/DL (ref 7–18)
CALCIUM SERPL-MCNC: 8.9 MG/DL (ref 8.5–10.1)
CHLORIDE SERPL-SCNC: 104 MMOL/L (ref 98–107)
CO2 SERPL-SCNC: 26 MMOL/L (ref 21–32)
CREAT SERPL-MCNC: 0.9 MG/DL (ref 0.7–1.3)
DEPRECATED RDW RBC AUTO: 13.9 % (ref 11.9–15.9)
GLUCOSE SERPL-MCNC: 111 MG/DL (ref 74–106)
HCT VFR BLD CALC: 36.8 % (ref 35.4–49)
HGB BLD-MCNC: 12.6 GM/DL (ref 11.7–16.9)
MAGNESIUM SERPL-MCNC: 2.3 MG/DL (ref 1.8–2.4)
MCH RBC QN AUTO: 29.9 PG (ref 25.7–33.7)
MCHC RBC AUTO-ENTMCNC: 34.3 G/DL (ref 32–35.9)
MCV RBC: 87 FL (ref 80–96)
PHOSPHATE SERPL-MCNC: 3.3 MG/DL (ref 2.5–4.9)
PLATELET # BLD AUTO: 205 K/MM3 (ref 134–434)
PMV BLD: 8.1 FL (ref 7.5–11.1)
POTASSIUM SERPLBLD-SCNC: 3.7 MMOL/L (ref 3.5–5.1)
RBC # BLD AUTO: 4.23 M/MM3 (ref 4–5.6)
SODIUM SERPL-SCNC: 143 MMOL/L (ref 136–145)
URATE SERPL-SCNC: 6 MG/DL (ref 2.6–7.2)
WBC # BLD AUTO: 5.4 K/MM3 (ref 4–10)

## 2018-03-03 RX ADMIN — ASPIRIN 81 MG SCH MG: 81 TABLET ORAL at 09:44

## 2018-03-03 RX ADMIN — ATORVASTATIN CALCIUM SCH MG: 40 TABLET, FILM COATED ORAL at 21:54

## 2018-03-03 RX ADMIN — TORSEMIDE SCH MG: 20 TABLET ORAL at 09:44

## 2018-03-03 RX ADMIN — DONEPEZIL HYDROCHLORIDE SCH MG: 10 TABLET, FILM COATED ORAL at 09:44

## 2018-03-03 RX ADMIN — VANCOMYCIN HYDROCHLORIDE SCH MLS/HR: 1 INJECTION, POWDER, LYOPHILIZED, FOR SOLUTION INTRAVENOUS at 14:03

## 2018-03-03 RX ADMIN — INSULIN ASPART SCH: 100 INJECTION, SOLUTION INTRAVENOUS; SUBCUTANEOUS at 21:55

## 2018-03-03 RX ADMIN — PIPERACILLIN SODIUM,TAZOBACTAM SODIUM SCH MLS/HR: 3; .375 INJECTION, POWDER, FOR SOLUTION INTRAVENOUS at 09:44

## 2018-03-03 RX ADMIN — HEPARIN SODIUM SCH UNIT: 5000 INJECTION, SOLUTION INTRAVENOUS; SUBCUTANEOUS at 14:03

## 2018-03-03 RX ADMIN — INSULIN ASPART SCH: 100 INJECTION, SOLUTION INTRAVENOUS; SUBCUTANEOUS at 06:32

## 2018-03-03 RX ADMIN — POTASSIUM CHLORIDE SCH MEQ: 1500 TABLET, EXTENDED RELEASE ORAL at 09:44

## 2018-03-03 RX ADMIN — HEPARIN SODIUM SCH UNIT: 5000 INJECTION, SOLUTION INTRAVENOUS; SUBCUTANEOUS at 06:37

## 2018-03-03 RX ADMIN — VANCOMYCIN HYDROCHLORIDE SCH MLS/HR: 1 INJECTION, POWDER, LYOPHILIZED, FOR SOLUTION INTRAVENOUS at 02:41

## 2018-03-03 RX ADMIN — ACETAMINOPHEN PRN MG: 325 TABLET ORAL at 14:06

## 2018-03-03 RX ADMIN — PIPERACILLIN SODIUM,TAZOBACTAM SODIUM SCH MLS/HR: 3; .375 INJECTION, POWDER, FOR SOLUTION INTRAVENOUS at 17:39

## 2018-03-03 RX ADMIN — PIPERACILLIN SODIUM,TAZOBACTAM SODIUM SCH MLS/HR: 3; .375 INJECTION, POWDER, FOR SOLUTION INTRAVENOUS at 01:45

## 2018-03-03 RX ADMIN — HEPARIN SODIUM SCH UNIT: 5000 INJECTION, SOLUTION INTRAVENOUS; SUBCUTANEOUS at 21:54

## 2018-03-03 RX ADMIN — ACETAMINOPHEN PRN MG: 325 TABLET ORAL at 22:33

## 2018-03-03 RX ADMIN — TAMSULOSIN HYDROCHLORIDE SCH MG: 0.4 CAPSULE ORAL at 08:15

## 2018-03-03 RX ADMIN — INSULIN ASPART SCH UNITS: 100 INJECTION, SOLUTION INTRAVENOUS; SUBCUTANEOUS at 16:56

## 2018-03-03 RX ADMIN — INSULIN ASPART SCH: 100 INJECTION, SOLUTION INTRAVENOUS; SUBCUTANEOUS at 11:43

## 2018-03-03 NOTE — CONS
DATE OF CONSULTATION:

 

DATE OF DICTATION:  03/02/2018

 

INFECTIOUS DISEASE CONSULTATION 

 

HISTORY OF PRESENT ILLNESS : An 82-year-old diabetic male evaluated for cellulitis of

the left foot.  The patient reports he had developed redness along the medial aspect

of the right foot about 10 days prior to admission.  He was seen by his podiatrist. 

He was prescribed an oral antibiotic which he took for 7 days.  Over the past 3 days

he developed worsening erythema, warmth, and swelling of the left 2nd toe.  He denies

any traumatic injury to his foot.  He denies prior history of serious soft tissue

infection requiring hospitalization or history of MRSA.  He denies any associated

fever or chills.  No reports of purulent drainage from the toe.

 

PAST MEDICAL HISTORY:  Positive for diabetes mellitus, hypertension, BPH, coronary

artery disease, COPD, obstructive sleep apnea, congestive heart failure,

osteoarthritis.

PAST SURGICAL HISTORY:  Status post left total knee replacement, coronary artery

bypass .  

 

ALLERGIES:  No known allergies.

 

MEDICATION:  Lasix, Toprol, tamsulosin, aspirin, Lipitor, Sinemet, metformin. 

 

SOCIAL HISTORY:  Lives at home with his wife.  Nonsmoker.  Nondrinker.

 

SYSTEMS REVIEW:  

Neurologic:  No loss of consciousness, seizure activity, or focal weakness.

Cardiac:  Negative chest pain or palpitations.  

Respiratory:  Negative cough or sputum production.  

Gastrointestinal:  Negative vomiting or diarrhea.  

Genitourinary:  Negative for urinary tract infection.  

 

LABORATORY DATA:  White count 6.3, hematocrit 34.1, platelet count 187, BUN 14,

creatinine 0.9, lactic acid 3.1, ESR 24, C-reactive protein 0.9.  Urine leukocyte

esterase negative.  Chest x-ray negative for fracture or dislocation.  

 

PHYSICAL EXAMINATION:

General:  He is awake and alert, in no acute distress.  

Vital signs:  Temperature 98.0, blood pressure 106/55, pulse 77 regular, respirations

20 per minute.  

HEENT:  Sclerae anicteric. 

Cardiovascular:  Heart sounds S1, S2. 

Respiratory:  Lungs clear.  

Abdomen:  Obese.  Soft.  Nontender.  

Extremities:  Negative for edema.  Right foot, no evidence of erythema, warmth, or

swelling.  Left foot, there is diffuse swelling of the left 2nd toe with a fluctuant

area of the mid dorsal aspect of the toe.  No expressible pus is attended to

palpation.  

 

IMPRESSION:

1.  Cellulitis of the left second toe.

2.  Possible soft tissue abscess left second toe.  

3.  Diabetes mellitus.  

 

Await cultures.  Obtain surgical evaluation.  Empiric antibiotic coverage with

vancomycin and Zosyn.  Local wound care.  Will follow. Thank you for the kind

referral.  

 

 

BENJAMIN MENA M.D.

 

[id number]

DD: 03/02/2018 13:29

DT: 03/03/2018 00:41

Job #:  [id number]

## 2018-03-03 NOTE — CONSULT
Consult - text type





- Consultation


Consultation Note: 





Podiatry Consultation:





Pleasant 82 year old DM M presents to ED, sent by podiatrist, for swelling/

redness/pain L 2nd digit.  Patient states he has never had this problem before.

  Denies F/V/N/C/SOB/CP.  Denies injury to the area.  Denies any open sores, 

drainage to the area.  Afebrile, VSS.





PMHx: DM, HTN, CAD s/p CABG x 5


Meds: noted


ALL: NKMA





YOLANDE:





L foot: pedal pulses 1/4, TG wnl, CFT brisk to all toes.  There is a rigid 

contracted 2nd hammer toe deformity with localized erythema and some fluctuance 

dorsal PIPJ.  There is tenderness on palpation and range of motion.  There is 

no open ulcer, no drainage, no streaking cellulitis, no signs of acute 

infection.  Epicritic sensation grossly intact.





WBC: 6.3


ESR: 23





Blood Cx: no growth





L foot XR: contracture 2nd digit, no evidence of osteomyelitis; exostosis 

dorsal midfoot





Imp: 82 year old DM M with likely L 2nd digit bursitis


1. IV abx per ID


2. Due to biomechanics of foot, often patients with contracted 2nd digit 

develop bursitis secondary to shoegear irritation which is likely the cause of 

the localized redness to the toe.  If there is concern, further imaging may be 

warranted, i.e. MRI.  Discussed with medical care team.


3. No acute intervention at this time.  Thanks for the consult.





ALICIA Holley DPM

## 2018-03-03 NOTE — PN
Teaching Attending Note


Name of Resident: Kwasi Sorensen





ATTENDING PHYSICIAN STATEMENT





I saw and evaluated the patient.


I reviewed the resident's note and discussed the case with the resident.


I agree with the resident's findings and plan as documented.








SUBJECTIVE:


Patient seen and examined, left foot pain with some improvement, no fevers/

chills or new concerns. 





OBJECTIVE:


 Vital Signs











 Period  Temp  Pulse  Resp  BP Sys/Lowe  Pulse Ox


 


 Last 24 Hr  97.7 F-98.4 F  70-80  20-20  115-131/58-70  98








 Intake & Output











 02/28/18 03/01/18 03/02/18 03/03/18





 23:59 23:59 23:59 23:59


 


Intake Total  2300 1000 


 


Output Total  300 250 


 


Balance  2000 750 


 


Weight  237 lb 243 lb 8 oz 238 lb 5 oz








General: sitting in bed in no acute distress


Extremities: left foot dorsum swelling with improved tenderness, left second 

toe with improved erythema, persistent swelling





 Home Medication List











 Medication  Instructions  Recorded  Confirmed  Type


 


Metoprolol Succinate [Toprol Xl -] 25 mg PO DAILY 04/15/15 03/01/18 History


 


Potassium Chloride 20 meq PO DAILY 04/15/15 03/01/18 History


 


Tamsulosin HCl 0.4 mg PO DAILY 04/15/15 03/01/18 History


 


Aspirin 81 mg PO DAILY 03/01/18 03/01/18 History


 


Atorvastatin Ca [Lipitor] 40 mg PO DAILY 03/01/18 03/02/18 History


 


Carbidopa/Levodopa *Cr* 50/200 1 tab PO TID 03/01/18 03/02/18 History





[Sinemet *Cr* 50/200 -]    


 


Metformin HCl 500 mg PO TID 03/01/18 03/01/18 History


 


Torsemide 20 mg PO DAILY 03/01/18 03/02/18 History


 


Donepezil HCl [Aricept] 10 mg PO DAILY 03/02/18 03/02/18 History








 Active Medications











Generic Name Dose Route Start Last Admin





  Trade Name Freq  PRN Reason Stop Dose Admin


 


Acetaminophen  650 mg  03/02/18 17:12  





  Tylenol -  PO   





  Q4H PRN   





  PAIN LEVEL 6-10   


 


Aspirin  81 mg  03/02/18 10:00  03/02/18 09:58





  Asa -  PO   81 mg





  DAILY [facility]CHELL   Administration


 


Atorvastatin Calcium  40 mg  03/02/18 22:00  03/02/18 22:10





  Lipitor -  PO   40 mg





  HS MITCHELL   Administration


 


Carbidopa/Levodopa  1 combo  03/02/18 22:00  03/03/18 06:37





  Sinemet *Cr* 50/200 -  PO   1 combo





  TID MITCHELL   Administration


 


Donepezil HCl  10 mg  03/02/18 15:45  03/02/18 17:53





  Aricept -  PO   10 mg





  DAILY [facility]LL   Administration


 


Heparin Sodium (Porcine)  5,000 unit  03/02/18 06:00  03/03/18 06:37





  Heparin -  SQ   5,000 unit





  TID MITCHELL   Administration


 


Vancomycin HCl 1,000 mg/  250 mls @ 250 mls/hr  03/02/18 14:30  03/03/18 02:41





  Dextrose  IVPB   250 mls/hr





  Q12H MITCHELL   Administration





  Protocol   


 


Piperacillin Sod/Tazobactam  50 mls @ 100 mls/hr  03/02/18 18:00  03/03/18 01:45





  Sod 3.375 gm/ Dextrose  IVPB   100 mls/hr





  Q8H-IV MITCHELL   Administration





  Protocol   


 


Insulin Aspart  1 vial  03/02/18 07:00  03/03/18 06:32





  Novolog Vial Sliding Scale -  SQ   Not Given





  ACHS MIT[facility]LL   





  Protocol   


 


Metoprolol Succinate  25 mg  03/02/18 10:00  03/02/18 10:00





  Toprol Xl -  PO   25 mg





  DAILY [facility]CHELL   Administration


 


Potassium Chloride  20 meq  03/02/18 10:00  03/02/18 09:59





  K-Dur -  PO   20 meq





  DAILY [facility]CHELL   Administration


 


Tamsulosin HCl  0.4 mg  03/02/18 08:30  03/03/18 08:15





  Flomax -  PO   0.4 mg





  DAILY@0830 MITCHELL   Administration


 


Torsemide  20 mg  03/02/18 15:34  





  Demadex -  PO   





  DAILY [facility]   








 Laboratory Results - last 24 hr











  03/02/18 03/02/18 03/02/18





  06:40 06:40 11:37


 


WBC   


 


RBC   


 


Hgb   


 


Hct   


 


MCV   


 


MCH   


 


MCHC   


 


RDW   


 


Plt Count   


 


MPV   


 


Sodium   


 


Potassium   


 


Chloride   


 


Carbon Dioxide   


 


Anion Gap   


 


BUN   


 


Creatinine   


 


POC Glucometer    107


 


Random Glucose   


 


Hemoglobin A1c %   6.2 H 


 


Uric Acid   


 


Calcium   


 


Phosphorus   


 


Magnesium   


 


C-Reactive Protein  0.9 H  














  03/02/18 03/02/18 03/03/18





  17:16 22:09 06:31


 


WBC   


 


RBC   


 


Hgb   


 


Hct   


 


MCV   


 


MCH   


 


MCHC   


 


RDW   


 


Plt Count   


 


MPV   


 


Sodium   


 


Potassium   


 


Chloride   


 


Carbon Dioxide   


 


Anion Gap   


 


BUN   


 


Creatinine   


 


POC Glucometer  125  110  115


 


Random Glucose   


 


Hemoglobin A1c %   


 


Uric Acid   


 


Calcium   


 


Phosphorus   


 


Magnesium   


 


C-Reactive Protein   














  03/03/18 03/03/18





  07:00 07:00


 


WBC  5.4 


 


RBC  4.23 


 


Hgb  12.6 


 


Hct  36.8 


 


MCV  87.0 


 


MCH  29.9 


 


MCHC  34.3 


 


RDW  13.9 


 


Plt Count  205 


 


MPV  8.1 


 


Sodium   143


 


Potassium   3.7


 


Chloride   104


 


Carbon Dioxide   26


 


Anion Gap   13


 


BUN   9  D


 


Creatinine   0.9


 


POC Glucometer  


 


Random Glucose   111 H D


 


Hemoglobin A1c %  


 


Uric Acid   6.0


 


Calcium   8.9


 


Phosphorus   3.3  D


 


Magnesium   2.3  D


 


C-Reactive Protein  








 Microbiology





03/01/18 21:30   Urine - Urine Clean Catch   Urine Culture - Final


03/01/18 15:45   Blood - Peripheral Venous   Blood Culture - Preliminary


                            NO GROWTH OBTAINED AFTER 24 HOURS, INCUBATION TO 

CONTINUE


                            FOR 4 DAYS.


03/01/18 15:45   Blood - Peripheral Venous   Blood Culture - Preliminary


                            NO GROWTH OBTAINED AFTER 24 HOURS, INCUBATION TO 

CONTINUE


                            FOR 4 DAYS.











ASSESSMENT AND PLAN:


82 yom with HTN, DM, CAD s/p CABG/5 stents, ?CHF admitted with left diabetic 

foot





-Left diabetic foot with left second toe cellulitis+/- abscess and ?abscess of 

dorsum left foot


-Lactic acidosis, resolved


-CAD s/p CABG/PCI, ?CHF


-HTN


-NIDDM





Plan:


Zosyn/vancomycin. ID input appreciated. 


Discussed with Dr. Holley, so plans for surgical intervention now. 


Left dorsum symptoms suspects from bony spur. 


Follow up MRI. 


Hold off additional IVF for now. lactic acidosis resolved. 


Torsemide, K, phos supplementation. 


Hold metformin, ISS, diabetic diet. 


Continue ASA/statin/metoprolol. 


DVTPPx


dispo pending surgical plans and antibiotic needs. 


Plan discussed with patient in detail, all questions answered.

## 2018-03-03 NOTE — PN
Physical Exam: 


SUBJECTIVE: Patient seen and examined


still complains of pain in his foot. 


states nothing change since yesterday 








OBJECTIVE:





 Vital Signs











 Period  Temp  Pulse  Resp  BP Sys/Lowe  Pulse Ox


 


 Last 24 Hr  97.7 F-98.4 F  70-80  20-20  115-131/58-70  98











GENERAL: The patient is awake, alert, and fully oriented, in no acute distress.


LUNGS: Breath sounds equal, clear to auscultation bilaterally, no wheezes, no 

crackles, no accessory muscle use. 


HEART: Regular rate and rhythm, S1, S2 without murmur, rub or gallop.


ABDOMEN: Soft, nontender, nondistended, normoactive bowel sounds, no guarding.


EXTREMITIES: 1+ jillian pulses, warm, well-perfused, no edema.  Left 2nd toe 

erythematous.  Dorsum of Left foot quite tender to palpation.


PSYCH: Normal mood, normal affect














 Laboratory Results - last 24 hr











  03/02/18 03/02/18 03/02/18





  06:40 11:37 17:16


 


WBC   


 


RBC   


 


Hgb   


 


Hct   


 


MCV   


 


MCH   


 


MCHC   


 


RDW   


 


Plt Count   


 


MPV   


 


Sodium   


 


Potassium   


 


Chloride   


 


Carbon Dioxide   


 


Anion Gap   


 


BUN   


 


Creatinine   


 


POC Glucometer   107  125


 


Random Glucose   


 


Uric Acid   


 


Calcium   


 


Phosphorus   


 


Magnesium   


 


C-Reactive Protein  0.9 H  














  03/02/18 03/03/18 03/03/18





  22:09 06:31 07:00


 


WBC    5.4


 


RBC    4.23


 


Hgb    12.6


 


Hct    36.8


 


MCV    87.0


 


MCH    29.9


 


MCHC    34.3


 


RDW    13.9


 


Plt Count    205


 


MPV    8.1


 


Sodium   


 


Potassium   


 


Chloride   


 


Carbon Dioxide   


 


Anion Gap   


 


BUN   


 


Creatinine   


 


POC Glucometer  110  115 


 


Random Glucose   


 


Uric Acid   


 


Calcium   


 


Phosphorus   


 


Magnesium   


 


C-Reactive Protein   














  03/03/18





  07:00


 


WBC 


 


RBC 


 


Hgb 


 


Hct 


 


MCV 


 


MCH 


 


MCHC 


 


RDW 


 


Plt Count 


 


MPV 


 


Sodium  143


 


Potassium  3.7


 


Chloride  104


 


Carbon Dioxide  26


 


Anion Gap  13


 


BUN  9  D


 


Creatinine  0.9


 


POC Glucometer 


 


Random Glucose  111 H D


 


Uric Acid  6.0


 


Calcium  8.9


 


Phosphorus  3.3  D


 


Magnesium  2.3  D


 


C-Reactive Protein 








Active Medications











Generic Name Dose Route Start Last Admin





  Trade Name Freq  PRN Reason Stop Dose Admin


 


Acetaminophen  650 mg  03/02/18 17:12  





  Tylenol -  PO   





  Q4H PRN   





  PAIN LEVEL 6-10   


 


Aspirin  81 mg  03/02/18 10:00  03/03/18 09:44





  Asa -  PO   81 mg





  DAILY MITCHELL   Administration


 


Atorvastatin Calcium  40 mg  03/02/18 22:00  03/02/18 22:10





  Lipitor -  PO   40 mg





  HS MITCHELL   Administration


 


Carbidopa/Levodopa  1 combo  03/02/18 22:00  03/03/18 06:37





  Sinemet *Cr* 50/200 -  PO   1 combo





  TID [facility]CHELL   Administration


 


Donepezil HCl  10 mg  03/02/18 15:45  03/03/18 09:44





  Aricept -  PO   10 mg





  DAILY [facility]CHELL   Administration


 


Heparin Sodium (Porcine)  5,000 unit  03/02/18 06:00  03/03/18 06:37





  Heparin -  SQ   5,000 unit





  TID MITCHELL   Administration


 


Vancomycin HCl 1,000 mg/  250 mls @ 250 mls/hr  03/02/18 14:30  03/03/18 02:41





  Dextrose  IVPB   250 mls/hr





  Q12H MITCHELL   Administration





  Protocol   


 


Piperacillin Sod/Tazobactam  50 mls @ 100 mls/hr  03/02/18 18:00  03/03/18 09:44





  Sod 3.375 gm/ Dextrose  IVPB   100 mls/hr





  Q8H-IV MITCHELL   Administration





  Protocol   


 


Insulin Aspart  1 vial  03/02/18 07:00  03/03/18 06:32





  Novolog Vial Sliding Scale -  SQ   Not Given





  ACHS MITCHELL   





  Protocol   


 


Metoprolol Succinate  25 mg  03/02/18 10:00  03/03/18 09:44





  Toprol Xl -  PO   25 mg





  DAILY MITCHELL   Administration


 


Potassium Chloride  20 meq  03/02/18 10:00  03/03/18 09:44





  K-Dur -  PO   20 meq





  DAILY MITCHELL   Administration


 


Tamsulosin HCl  0.4 mg  03/02/18 08:30  03/03/18 08:15





  Flomax -  PO   0.4 mg





  DAILY@0830 [facility]CHELL   Administration


 


Torsemide  20 mg  03/02/18 15:34  03/03/18 09:44





  Demadex -  PO   20 mg





  DAILY MITCHELL   Administration











ASSESSMENT/PLAN: 82M with PMH of DM, htn, CAD, presents with swelling in dorsum 

of Left foot and to Left 2nd toe, admitted for Left diabetic foot.





# Left diabetic foot with Left 2nd toe cellulitis - 


   continue antibiotics as per ID 


   Podiatry consult appreciated: pain and erythema can be due to bursitis and 

on dorsum of foot is bony spur


   MRI pending. 





# NIDDM


   - BGMs


   - hgba1c 6.2,


   - on insulin sliding scale. 


          - metformin on hold. 





# CAD


   - continue home meds of ASA and Lipitor





# htn


   - continue home meds of Toprol XL and Torsemide





# hypophosphatemia


   - improved phos 3.3





# FEN 


   - Fluids: po


   - Electrolytes: repeat tomorrow 


   - Nutrition: diabetic diet 





# Prophylaxis


   - DVT ppx with Heparin TID


   





dispo


: med surg








Visit type





- Emergency Visit


Emergency Visit: Yes


ED Registration Date: 03/02/18


Care time: The patient presented to the Emergency Department on the above date 

and was hospitalized for further evaluation of their emergent condition.





- New Patient


This patient is new to me today: No





- Critical Care


Critical Care patient: No

## 2018-03-04 RX ADMIN — VANCOMYCIN HYDROCHLORIDE SCH MLS/HR: 1 INJECTION, POWDER, LYOPHILIZED, FOR SOLUTION INTRAVENOUS at 16:01

## 2018-03-04 RX ADMIN — PIPERACILLIN SODIUM,TAZOBACTAM SODIUM SCH MLS/HR: 3; .375 INJECTION, POWDER, FOR SOLUTION INTRAVENOUS at 09:20

## 2018-03-04 RX ADMIN — INSULIN ASPART SCH: 100 INJECTION, SOLUTION INTRAVENOUS; SUBCUTANEOUS at 21:22

## 2018-03-04 RX ADMIN — PIPERACILLIN SODIUM,TAZOBACTAM SODIUM SCH MLS/HR: 3; .375 INJECTION, POWDER, FOR SOLUTION INTRAVENOUS at 17:03

## 2018-03-04 RX ADMIN — ASPIRIN 81 MG SCH MG: 81 TABLET ORAL at 09:20

## 2018-03-04 RX ADMIN — INSULIN ASPART SCH: 100 INJECTION, SOLUTION INTRAVENOUS; SUBCUTANEOUS at 16:42

## 2018-03-04 RX ADMIN — ATORVASTATIN CALCIUM SCH MG: 40 TABLET, FILM COATED ORAL at 21:21

## 2018-03-04 RX ADMIN — DONEPEZIL HYDROCHLORIDE SCH MG: 10 TABLET, FILM COATED ORAL at 09:20

## 2018-03-04 RX ADMIN — TAMSULOSIN HYDROCHLORIDE SCH MG: 0.4 CAPSULE ORAL at 08:03

## 2018-03-04 RX ADMIN — ACETAMINOPHEN PRN MG: 325 TABLET ORAL at 18:38

## 2018-03-04 RX ADMIN — TORSEMIDE SCH MG: 20 TABLET ORAL at 09:20

## 2018-03-04 RX ADMIN — HEPARIN SODIUM SCH UNIT: 5000 INJECTION, SOLUTION INTRAVENOUS; SUBCUTANEOUS at 13:56

## 2018-03-04 RX ADMIN — VANCOMYCIN HYDROCHLORIDE SCH MLS/HR: 1 INJECTION, POWDER, LYOPHILIZED, FOR SOLUTION INTRAVENOUS at 02:35

## 2018-03-04 RX ADMIN — HEPARIN SODIUM SCH UNIT: 5000 INJECTION, SOLUTION INTRAVENOUS; SUBCUTANEOUS at 21:21

## 2018-03-04 RX ADMIN — INSULIN ASPART SCH: 100 INJECTION, SOLUTION INTRAVENOUS; SUBCUTANEOUS at 06:11

## 2018-03-04 RX ADMIN — HEPARIN SODIUM SCH UNIT: 5000 INJECTION, SOLUTION INTRAVENOUS; SUBCUTANEOUS at 06:11

## 2018-03-04 RX ADMIN — POTASSIUM CHLORIDE SCH MEQ: 1500 TABLET, EXTENDED RELEASE ORAL at 09:20

## 2018-03-04 RX ADMIN — INSULIN ASPART SCH: 100 INJECTION, SOLUTION INTRAVENOUS; SUBCUTANEOUS at 10:47

## 2018-03-04 RX ADMIN — PIPERACILLIN SODIUM,TAZOBACTAM SODIUM SCH MLS/HR: 3; .375 INJECTION, POWDER, FOR SOLUTION INTRAVENOUS at 01:35

## 2018-03-04 NOTE — PN
Teaching Attending Note


Name of Resident: Radha Louise





Time of evaluation: 9:30 AM





SUBJECTIVE:


Patient seen and examined, left foot symptoms with some improvement, still with 

pain. 


no new complaints. 





OBJECTIVE:


 Vital Signs











 Period  Temp  Pulse  Resp  BP Sys/Lowe  Pulse Ox


 


 Last 24 Hr  97.8 F-98.8 F  55-86  19-21  107-115/55-70  96-97








 Intake & Output











 03/01/18 03/02/18 03/03/18 03/04/18





 23:59 23:59 23:59 23:59


 


Intake Total 2300 1000 800 300


 


Output Total 300 250  


 


Balance 2000 750 800 300


 


Weight 237 lb 243 lb 8 oz 238 lb 5 oz 234 lb








general: sitting in bed in no acute distress


Extremities: left second toe persistent swelling with tenderness but resolved 

erythema, tenderness overal swelling/bony spur dorsum left foot but improved 

swelling, no overlying erythema noted


Abdomen: soft, obese,NT


chest: no rales or wheezing





 Home Medication List











 Medication  Instructions  Recorded  Confirmed  Type


 


Metoprolol Succinate [Toprol Xl -] 25 mg PO DAILY 04/15/15 03/01/18 History


 


Potassium Chloride 20 meq PO DAILY 04/15/15 03/01/18 History


 


Tamsulosin HCl 0.4 mg PO DAILY 04/15/15 03/01/18 History


 


Aspirin 81 mg PO DAILY 03/01/18 03/01/18 History


 


Atorvastatin Ca [Lipitor] 40 mg PO DAILY 03/01/18 03/02/18 History


 


Carbidopa/Levodopa *Cr* 50/200 1 tab PO TID 03/01/18 03/02/18 History





[Sinemet *Cr* 50/200 -]    


 


Metformin HCl 500 mg PO TID 03/01/18 03/01/18 History


 


Torsemide 20 mg PO DAILY 03/01/18 03/02/18 History


 


Donepezil HCl [Aricept] 10 mg PO DAILY 03/02/18 03/02/18 History








 Active Medications











Generic Name Dose Route Start Last Admin





  Trade Name Freq  PRN Reason Stop Dose Admin


 


Acetaminophen  650 mg  03/02/18 17:12  03/03/18 22:33





  Tylenol -  PO   650 mg





  Q4H PRN   Administration





  PAIN LEVEL 6-10   


 


Aspirin  81 mg  03/02/18 10:00  03/04/18 09:20





  Asa -  PO   81 mg





  DAILY MITCHELL   Administration


 


Atorvastatin Calcium  40 mg  03/02/18 22:00  03/03/18 21:54





  Lipitor -  PO   40 mg





  HS MITCHELL   Administration


 


Carbidopa/Levodopa  1 combo  03/02/18 22:00  03/04/18 13:56





  Sinemet *Cr* 50/200 -  PO   1 combo





  TID MITCHELL   Administration


 


Donepezil HCl  10 mg  03/02/18 15:45  03/04/18 09:20





  Aricept -  PO   10 mg





  DAILY MITCHELL   Administration


 


Heparin Sodium (Porcine)  5,000 unit  03/02/18 06:00  03/04/18 13:56





  Heparin -  SQ   5,000 unit





  TID MITCHELL   Administration


 


Vancomycin HCl 1,000 mg/  250 mls @ 250 mls/hr  03/02/18 14:30  03/04/18 02:35





  Dextrose  IVPB   250 mls/hr





  Q12H MITCHELL   Administration





  Protocol   


 


Piperacillin Sod/Tazobactam  50 mls @ 100 mls/hr  03/02/18 18:00  03/04/18 09:20





  Sod 3.375 gm/ Dextrose  IVPB   100 mls/hr





  Q8H-IV MITCHELL   Administration





  Protocol   


 


Insulin Aspart  1 vial  03/02/18 07:00  03/04/18 10:47





  Novolog Vial Sliding Scale -  SQ   Not Given





  ACHS MITCHELL   





  Protocol   


 


Metoprolol Succinate  25 mg  03/02/18 10:00  03/04/18 09:20





  Toprol Xl -  PO   25 mg





  DAILY MITCHELL   Administration


 


Potassium Chloride  20 meq  03/02/18 10:00  03/04/18 09:20





  K-Dur -  PO   20 meq





  DAILY MITCHELL   Administration


 


Tamsulosin HCl  0.4 mg  03/02/18 08:30  03/04/18 08:03





  Flomax -  PO   0.4 mg





  DAILY@0830 MITCHELL   Administration


 


Torsemide  20 mg  03/02/18 15:34  03/04/18 09:20





  Demadex -  PO   20 mg





  DAILY MITCHELL   Administration








 Laboratory Results - last 24 hr











  03/03/18 03/03/18 03/04/18





  16:54 21:53 06:10


 


POC Glucometer  153  108  103














  03/04/18





  10:46


 


POC Glucometer  136








 Microbiology





03/01/18 15:45   Blood - Peripheral Venous   Blood Culture - Preliminary


                            NO GROWTH OBTAINED AFTER 48 HOURS, INCUBATION TO 

CONTINUE


                            FOR 3 DAYS.


03/01/18 15:45   Blood - Peripheral Venous   Blood Culture - Preliminary


                            NO GROWTH OBTAINED AFTER 48 HOURS, INCUBATION TO 

CONTINUE


                            FOR 3 DAYS.


03/01/18 21:30   Urine - Urine Clean Catch   Urine Culture - Final











ASSESSMENT AND PLAN:


82 yom with HTN, DM, CAD s/p CABG/5 stents, ?CHF admitted with left diabetic 

foot





-Left diabetic foot with left second toe cellulitis+/- abscess and ?abscess of 

dorsum left foot


-Lactic acidosis, resolved


-CAD s/p CABG/PCI, ?CHF


-HTN


-NIDDM





Plan:


Zosyn/vancomycin. ID input appreciated. Monitor vanco levels, 


Check uric acid


Discussed with Dr. Holley, so plans for surgical intervention now. 


Left dorsum symptoms suspects from bony spur. 


Follow up MRI foot results.


Hold off additional IVF for now. lactic acidosis resolved. 


Torsemide, K, phos supplementation. 


Hold metformin, ISS, diabetic diet. 


Continue ASA/statin/metoprolol. 


DVTPPx


dispo pending surgical plans and antibiotic needs. 


Plan discussed with patient in detail, all questions answered.

## 2018-03-04 NOTE — PN
Progress Note, Physician


History of Present Illness: 





C/O pain L great toe


No c/o pain L 2nd toe


No fever/ chills


MRI done , results pending





- Current Medication List


Current Medications: 


Active Medications





Acetaminophen (Tylenol -)  650 mg PO Q4H PRN


   PRN Reason: PAIN LEVEL 6-10


   Last Admin: 03/03/18 22:33 Dose:  650 mg


Aspirin (Asa -)  81 mg PO DAILY [facility]


   Last Admin: 03/04/18 09:20 Dose:  81 mg


Atorvastatin Calcium (Lipitor -)  40 mg PO HS [facility]


   Last Admin: 03/03/18 21:54 Dose:  40 mg


Carbidopa/Levodopa (Sinemet *Cr* 50/200 -)  1 combo PO TID [facility]


   Last Admin: 03/04/18 06:11 Dose:  1 combo


Donepezil HCl (Aricept -)  10 mg PO DAILY [facility]


   Last Admin: 03/04/18 09:20 Dose:  10 mg


Heparin Sodium (Porcine) (Heparin -)  5,000 unit SQ TID [facility]


   Last Admin: 03/04/18 06:11 Dose:  5,000 unit


Vancomycin HCl 1,000 mg/ (Dextrose)  250 mls @ 250 mls/hr IVPB Q12H MITCHELL


   PRN Reason: Protocol


   Last Admin: 03/04/18 02:35 Dose:  250 mls/hr


Piperacillin Sod/Tazobactam (Sod 3.375 gm/ Dextrose)  50 mls @ 100 mls/hr IVPB 

Q8H-IV MITCHELL


   PRN Reason: Protocol


   Last Admin: 03/04/18 09:20 Dose:  100 mls/hr


Insulin Aspart (Novolog Vial Sliding Scale -)  1 vial SQ ACHS MITCHELL


   PRN Reason: Protocol


   Last Admin: 03/04/18 10:47 Dose:  Not Given


Metoprolol Succinate (Toprol Xl -)  25 mg PO DAILY [facility]


   Last Admin: 03/04/18 09:20 Dose:  25 mg


Potassium Chloride (K-Dur -)  20 meq PO DAILY [facility]


   Last Admin: 03/04/18 09:20 Dose:  20 meq


Tamsulosin HCl (Flomax -)  0.4 mg PO DAILY@0830 [facility]


   Last Admin: 03/04/18 08:03 Dose:  0.4 mg


Torsemide (Demadex -)  20 mg PO DAILY [facility]


   Last Admin: 03/04/18 09:20 Dose:  20 mg











- Objective


Vital Signs: 


 Vital Signs











Temperature  98 F   03/04/18 08:50


 


Pulse Rate  86   03/04/18 08:50


 


Respiratory Rate  19   03/04/18 08:50


 


Blood Pressure  107/59   03/04/18 08:50


 


O2 Sat by Pulse Oximetry (%)  96   03/04/18 08:50











Constitutional: Yes: No Distress


Eyes: Yes: Conjunctiva Clear


Cardiovascular: Yes: Regular Rate and Rhythm, S1, S2


Respiratory: Yes: CTA Bilaterally


Gastrointestinal: Yes: Normal Bowel Sounds, Soft.  No: Tenderness


Extremities: Yes: Other (decreased swelling/ erythema L 2nd toe. Small area 

erythema L 1st MTH)


Labs: 


 CBC, BMP





 03/03/18 07:00 





 03/03/18 07:00 





 INR, PTT











INR  1.49  (0.82-1.09)  H  03/02/18  06:40    














Assessment/Plan





Cellulitis L 2nd toe- improved


? Gouty arthritis


Diabetes mellitus





Await MRI report


Continue vanco/ zosyn


Check uric acid

## 2018-03-05 LAB
ANION GAP SERPL CALC-SCNC: 6 MMOL/L (ref 8–16)
BASOPHILS # BLD: 0.7 % (ref 0–2)
BUN SERPL-MCNC: 11 MG/DL (ref 7–18)
CALCIUM SERPL-MCNC: 8.1 MG/DL (ref 8.5–10.1)
CHLORIDE SERPL-SCNC: 105 MMOL/L (ref 98–107)
CO2 SERPL-SCNC: 28 MMOL/L (ref 21–32)
CREAT SERPL-MCNC: 1 MG/DL (ref 0.7–1.3)
DEPRECATED RDW RBC AUTO: 13.8 % (ref 11.9–15.9)
EOSINOPHIL # BLD: 3.5 % (ref 0–4.5)
GLUCOSE SERPL-MCNC: 92 MG/DL (ref 74–106)
HCT VFR BLD CALC: 37.7 % (ref 35.4–49)
HGB BLD-MCNC: 12.9 GM/DL (ref 11.7–16.9)
LYMPHOCYTES # BLD: 25.8 % (ref 8–40)
MAGNESIUM SERPL-MCNC: 2.3 MG/DL (ref 1.8–2.4)
MCH RBC QN AUTO: 29.8 PG (ref 25.7–33.7)
MCHC RBC AUTO-ENTMCNC: 34.1 G/DL (ref 32–35.9)
MCV RBC: 87.2 FL (ref 80–96)
MONOCYTES # BLD AUTO: 10.4 % (ref 3.8–10.2)
NEUTROPHILS # BLD: 59.6 % (ref 42.8–82.8)
PHOSPHATE SERPL-MCNC: 3 MG/DL (ref 2.5–4.9)
PLATELET # BLD AUTO: 204 K/MM3 (ref 134–434)
PMV BLD: 8.5 FL (ref 7.5–11.1)
POTASSIUM SERPLBLD-SCNC: 3.8 MMOL/L (ref 3.5–5.1)
RBC # BLD AUTO: 4.32 M/MM3 (ref 4–5.6)
SODIUM SERPL-SCNC: 139 MMOL/L (ref 136–145)
URATE SERPL-SCNC: 4.9 MG/DL (ref 2.6–7.2)
WBC # BLD AUTO: 6 K/MM3 (ref 4–10)

## 2018-03-05 RX ADMIN — INSULIN ASPART SCH: 100 INJECTION, SOLUTION INTRAVENOUS; SUBCUTANEOUS at 17:24

## 2018-03-05 RX ADMIN — PIPERACILLIN SODIUM,TAZOBACTAM SODIUM SCH MLS/HR: 3; .375 INJECTION, POWDER, FOR SOLUTION INTRAVENOUS at 01:31

## 2018-03-05 RX ADMIN — HEPARIN SODIUM SCH UNIT: 5000 INJECTION, SOLUTION INTRAVENOUS; SUBCUTANEOUS at 14:30

## 2018-03-05 RX ADMIN — POTASSIUM CHLORIDE SCH MEQ: 1500 TABLET, EXTENDED RELEASE ORAL at 09:47

## 2018-03-05 RX ADMIN — TAMSULOSIN HYDROCHLORIDE SCH MG: 0.4 CAPSULE ORAL at 09:46

## 2018-03-05 RX ADMIN — INSULIN ASPART SCH: 100 INJECTION, SOLUTION INTRAVENOUS; SUBCUTANEOUS at 06:14

## 2018-03-05 RX ADMIN — ACETAMINOPHEN PRN MG: 325 TABLET ORAL at 14:30

## 2018-03-05 RX ADMIN — VANCOMYCIN HYDROCHLORIDE SCH MLS/HR: 1 INJECTION, POWDER, LYOPHILIZED, FOR SOLUTION INTRAVENOUS at 02:07

## 2018-03-05 RX ADMIN — DONEPEZIL HYDROCHLORIDE SCH MG: 10 TABLET, FILM COATED ORAL at 09:47

## 2018-03-05 RX ADMIN — ATORVASTATIN CALCIUM SCH MG: 40 TABLET, FILM COATED ORAL at 21:42

## 2018-03-05 RX ADMIN — ACETAMINOPHEN PRN MG: 325 TABLET ORAL at 06:14

## 2018-03-05 RX ADMIN — ASPIRIN 81 MG SCH MG: 81 TABLET ORAL at 09:47

## 2018-03-05 RX ADMIN — INSULIN ASPART SCH: 100 INJECTION, SOLUTION INTRAVENOUS; SUBCUTANEOUS at 21:20

## 2018-03-05 RX ADMIN — TORSEMIDE SCH MG: 20 TABLET ORAL at 09:47

## 2018-03-05 RX ADMIN — PIPERACILLIN SODIUM,TAZOBACTAM SODIUM SCH MLS/HR: 3; .375 INJECTION, POWDER, FOR SOLUTION INTRAVENOUS at 09:47

## 2018-03-05 RX ADMIN — VANCOMYCIN HYDROCHLORIDE SCH MLS/HR: 1 INJECTION, POWDER, LYOPHILIZED, FOR SOLUTION INTRAVENOUS at 15:27

## 2018-03-05 RX ADMIN — INSULIN ASPART SCH: 100 INJECTION, SOLUTION INTRAVENOUS; SUBCUTANEOUS at 11:49

## 2018-03-05 RX ADMIN — HEPARIN SODIUM SCH UNIT: 5000 INJECTION, SOLUTION INTRAVENOUS; SUBCUTANEOUS at 21:42

## 2018-03-05 RX ADMIN — HEPARIN SODIUM SCH UNIT: 5000 INJECTION, SOLUTION INTRAVENOUS; SUBCUTANEOUS at 06:13

## 2018-03-05 RX ADMIN — PIPERACILLIN SODIUM,TAZOBACTAM SODIUM SCH MLS/HR: 3; .375 INJECTION, POWDER, FOR SOLUTION INTRAVENOUS at 18:18

## 2018-03-05 NOTE — PN
Physical Exam: 


SUBJECTIVE: Patient seen and examined.  Pt c/o pain to dorsum of Left foot.  

Otherwise, pt has no c/o.  Pt denies chest pain, sob, abdominal pain, fever, 

chills, nausea, vomiting, constipation, diarrhea. 





OBJECTIVE:





 Vital Signs











 Period  Temp  Pulse  Resp  BP Sys/Lowe  Pulse Ox


 


 Last 24 Hr  98.1 F-98.5 F  74-95  18-21  106-125/56-59  95-96








GENERAL: The patient is awake, alert, and fully oriented, in no acute distress.


LUNGS: Breath sounds equal, clear to auscultation bilaterally, no wheezes, no 

crackles, no accessory muscle use. 


HEART: Regular rate and rhythm, S1, S2 without murmur, rub or gallop.


ABDOMEN: Soft, nontender, nondistended, normoactive bowel sounds, no guarding.


EXTREMITIES: 1+ jillian pulses, warm, well-perfused, no edema.  Left 2nd toe 

erythema improved, but swelling still present at Proximal Interphalangeal 

joint.  Dorsum of Left foot quite tender to palpation with diffuse edema over 

dorsum.


PSYCH: Normal mood, normal affect.





 Laboratory Results - last 24 hr











  03/04/18 03/04/18 03/05/18





  16:41 21:21 06:13


 


WBC   


 


RBC   


 


Hgb   


 


Hct   


 


MCV   


 


MCH   


 


MCHC   


 


RDW   


 


Plt Count   


 


MPV   


 


Neutrophils %   


 


Lymphocytes %   


 


Monocytes %   


 


Eosinophils %   


 


Basophils %   


 


Sodium   


 


Potassium   


 


Chloride   


 


Carbon Dioxide   


 


Anion Gap   


 


BUN   


 


Creatinine   


 


POC Glucometer  116  94  89


 


Random Glucose   


 


Uric Acid   


 


Calcium   


 


Phosphorus   


 


Magnesium   














  03/05/18 03/05/18 03/05/18





  06:35 06:35 11:49


 


WBC   6.0 


 


RBC   4.32 


 


Hgb   12.9 


 


Hct   37.7 


 


MCV   87.2 


 


MCH   29.8 


 


MCHC   34.1 


 


RDW   13.8 


 


Plt Count   204 


 


MPV   8.5 


 


Neutrophils %   59.6 


 


Lymphocytes %   25.8 


 


Monocytes %   10.4 H 


 


Eosinophils %   3.5 


 


Basophils %   0.7 


 


Sodium  139  


 


Potassium  3.8  


 


Chloride  105  


 


Carbon Dioxide  28  


 


Anion Gap  6 L  


 


BUN  11  D  


 


Creatinine  1.0  


 


POC Glucometer    123


 


Random Glucose  92  


 


Uric Acid  4.9  


 


Calcium  8.1 L  


 


Phosphorus  3.0  


 


Magnesium  2.3  








Active Medications











Generic Name Dose Route Start Last Admin





  Trade Name Freq  PRN Reason Stop Dose Admin


 


Acetaminophen  650 mg  03/02/18 17:12  03/05/18 14:30





  Tylenol -  PO   650 mg





  Q4H PRN   Administration





  PAIN LEVEL 6-10   


 


Aspirin  81 mg  03/02/18 10:00  03/05/18 09:47





  Asa -  PO   81 mg





  DAILY [facility]LL   Administration


 


Atorvastatin Calcium  40 mg  03/02/18 22:00  03/04/18 21:21





  Lipitor -  PO   40 mg





  HS [facility]   Administration


 


Carbidopa/Levodopa  1 combo  03/02/18 22:00  03/05/18 14:31





  Sinemet *Cr* 50/200 -  PO   1 combo





  TID [facility]   Administration


 


Donepezil HCl  10 mg  03/02/18 15:45  03/05/18 09:47





  Aricept -  PO   10 mg





  DAILY [facility]LL   Administration


 


Heparin Sodium (Porcine)  5,000 unit  03/02/18 06:00  03/05/18 14:30





  Heparin -  SQ   5,000 unit





  TID [facility]   Administration


 


Vancomycin HCl 1,000 mg/  250 mls @ 250 mls/hr  03/02/18 14:30  03/05/18 15:27





  Dextrose  IVPB   250 mls/hr





  Q12H [facility]   Administration





  Protocol   


 


Piperacillin Sod/Tazobactam  50 mls @ 100 mls/hr  03/02/18 18:00  03/05/18 09:47





  Sod 3.375 gm/ Dextrose  IVPB   100 mls/hr





  Q8H-IV [facility]   Administration





  Protocol   


 


Insulin Aspart  1 vial  03/02/18 07:00  03/05/18 11:49





  Novolog Vial Sliding Scale -  SQ   Not Given





  ACHS [facility]   





  Protocol   


 


Metoprolol Succinate  25 mg  03/02/18 10:00  03/05/18 09:47





  Toprol Xl -  PO   25 mg





  DAILY [facility]CHELL   Administration


 


Potassium Chloride  20 meq  03/02/18 10:00  03/05/18 09:47





  K-Dur -  PO   20 meq





  DAILY MITCHELL   Administration


 


Tamsulosin HCl  0.4 mg  03/02/18 08:30  03/05/18 09:46





  Flomax -  PO   0.4 mg





  DAILY@0830 [facility]   Administration


 


Torsemide  20 mg  03/02/18 15:34  03/05/18 09:47





  Demadex -  PO   20 mg





  DAILY MITCHELL   Administration








IMAGING:


3/2/18 Left LE MRI -> soft tissue edema of Left foot extending into 2nd toe.  

Faint bone marrow edema of middle phalynx of 2nd toe and early osteomyelitis 

cannot be ruled out.





ASSESSMENT/PLAN:


82M with PMH of DM, htn, CAD, presents with swelling in dorsum of Left foot and 

to Left 2nd toe, admitted for Left diabetic foot.





# Left diabetic foot with Left 2nd toe cellulitis - possible OM of Left foot


   - Podiatry Consult


   - Day 5 of IV Vancomycin and IV Zosyn


   - urine culture (-)


   - blood culture (-) x 72 hrs





# NIDDM


   - BGMs


   - SSI


   - hgba1c 6.2, suggesting DM is well controlled 


   - hold home med of Metformin





# CAD


   - continue home meds of ASA and Lipitor





# htn


   - continue home meds of Toprol XL and Torsemide





# FEN 


   - Fluids: po


   - Electrolytes: wnl, continue to monitor


   - Nutrition: diabetic diet 





# Prophylaxis


   - DVT ppx with Heparin TID


   - deconditioning ppx with PT





Visit type





- Emergency Visit


Emergency Visit: Yes


ED Registration Date: 03/02/18


Care time: The patient presented to the Emergency Department on the above date 

and was hospitalized for further evaluation of their emergent condition.





- New Patient


This patient is new to me today: No





- Critical Care


Critical Care patient: No

## 2018-03-05 NOTE — PN
Teaching Attending Note


Name of Resident: Celine Shay





ATTENDING PHYSICIAN STATEMENT


Time of evaluation: 10:20 AM


I saw and evaluated the patient.


I reviewed the resident's note and discussed the case with the resident.


I agree with the resident's findings and plan as documented.








SUBJECTIVE:


Patient seen and examined. left foot symptoms overall unchanged, no fevers or 

chills. 





OBJECTIVE:


 Vital Signs











 Period  Temp  Pulse  Resp  BP Sys/Lowe  Pulse Ox


 


 Last 24 Hr  98.1 F-98.5 F  74-95  18-21  106-125/56-59  95-96








 Intake & Output











 03/02/18 03/03/18 03/04/18 03/05/18





 23:59 23:59 23:59 23:59


 


Intake Total 4414 097 9099 875


 


Output Total 250   


 


Balance  875


 


Weight 243 lb 8 oz 238 lb 5 oz 234 lb 235 lb 12.8 oz








general; ambulating in room in no acute distress


Extremities: left second toe swelling with tenderness unchanged, improved 

erythema, left foot dorsum swelling less focal but with more generalized 

swelling over dorsum, pos DP pulses LLE














 Home Medication List











 Medication  Instructions  Recorded  Confirmed  Type


 


Metoprolol Succinate [Toprol Xl -] 25 mg PO DAILY 04/15/15 03/01/18 History


 


Potassium Chloride 20 meq PO DAILY 04/15/15 03/01/18 History


 


Tamsulosin HCl 0.4 mg PO DAILY 04/15/15 03/01/18 History


 


Aspirin 81 mg PO DAILY 03/01/18 03/01/18 History


 


Atorvastatin Ca [Lipitor] 40 mg PO DAILY 03/01/18 03/02/18 History


 


Carbidopa/Levodopa *Cr* 50/200 1 tab PO TID 03/01/18 03/02/18 History





[Sinemet *Cr* 50/200 -]    


 


Metformin HCl 500 mg PO TID 03/01/18 03/01/18 History


 


Torsemide 20 mg PO DAILY 03/01/18 03/02/18 History


 


Donepezil HCl [Aricept] 10 mg PO DAILY 03/02/18 03/02/18 History








 Active Medications











Generic Name Dose Route Start Last Admin





  Trade Name Freq  PRN Reason Stop Dose Admin


 


Acetaminophen  650 mg  03/02/18 17:12  03/05/18 14:30





  Tylenol -  PO   650 mg





  Q4H PRN   Administration





  PAIN LEVEL 6-10   


 


Aspirin  81 mg  03/02/18 10:00  03/05/18 09:47





  Asa -  PO   81 mg





  DAILY MITCHELL   Administration


 


Atorvastatin Calcium  40 mg  03/02/18 22:00  03/04/18 21:21





  Lipitor -  PO   40 mg





  HS [facility]CHELL   Administration


 


Carbidopa/Levodopa  1 combo  03/02/18 22:00  03/05/18 14:31





  Sinemet *Cr* 50/200 -  PO   1 combo





  TID [facility]   Administration


 


Donepezil HCl  10 mg  03/02/18 15:45  03/05/18 09:47





  Aricept -  PO   10 mg





  DAILY [facility]CHELL   Administration


 


Heparin Sodium (Porcine)  5,000 unit  03/02/18 06:00  03/05/18 14:30





  Heparin -  SQ   5,000 unit





  TID [facility]CHELL   Administration


 


Vancomycin HCl 1,000 mg/  250 mls @ 250 mls/hr  03/02/18 14:30  03/05/18 02:07





  Dextrose  IVPB   250 mls/hr





  Q12H [facility]   Administration





  Protocol   


 


Piperacillin Sod/Tazobactam  50 mls @ 100 mls/hr  03/02/18 18:00  03/05/18 09:47





  Sod 3.375 gm/ Dextrose  IVPB   100 mls/hr





  Q8H-IV [facility]   Administration





  Protocol   


 


Insulin Aspart  1 vial  03/02/18 07:00  03/05/18 11:49





  Novolog Vial Sliding Scale -  SQ   Not Given





  ACHS MITCHELL   





  Protocol   


 


Metoprolol Succinate  25 mg  03/02/18 10:00  03/05/18 09:47





  Toprol Xl -  PO   25 mg





  DAILY MITCHELL   Administration


 


Potassium Chloride  20 meq  03/02/18 10:00  03/05/18 09:47





  K-Dur -  PO   20 meq





  DAILY MITCHELL   Administration


 


Tamsulosin HCl  0.4 mg  03/02/18 08:30  03/05/18 09:46





  Flomax -  PO   0.4 mg





  DAILY@0830 [facility]LL   Administration


 


Torsemide  20 mg  03/02/18 15:34  03/05/18 09:47





  Demadex -  PO   20 mg





  DAILY MITCHELL   Administration








 Laboratory Results - last 24 hr











  03/04/18 03/04/18 03/04/18





  15:00 16:41 21:21


 


WBC   


 


RBC   


 


Hgb   


 


Hct   


 


MCV   


 


MCH   


 


MCHC   


 


RDW   


 


Plt Count   


 


MPV   


 


Neutrophils %   


 


Lymphocytes %   


 


Monocytes %   


 


Eosinophils %   


 


Basophils %   


 


Sodium   


 


Potassium   


 


Chloride   


 


Carbon Dioxide   


 


Anion Gap   


 


BUN   


 


Creatinine   


 


POC Glucometer   116  94


 


Random Glucose   


 


Uric Acid   


 


Calcium   


 


Phosphorus   


 


Magnesium   


 


Vancomycin Pre-Dose  15.5 H*  














  03/05/18 03/05/18 03/05/18





  06:13 06:35 06:35


 


WBC    6.0


 


RBC    4.32


 


Hgb    12.9


 


Hct    37.7


 


MCV    87.2


 


MCH    29.8


 


MCHC    34.1


 


RDW    13.8


 


Plt Count    204


 


MPV    8.5


 


Neutrophils %    59.6


 


Lymphocytes %    25.8


 


Monocytes %    10.4 H


 


Eosinophils %    3.5


 


Basophils %    0.7


 


Sodium   139 


 


Potassium   3.8 


 


Chloride   105 


 


Carbon Dioxide   28 


 


Anion Gap   6 L 


 


BUN   11  D 


 


Creatinine   1.0 


 


POC Glucometer  89  


 


Random Glucose   92 


 


Uric Acid   4.9 


 


Calcium   8.1 L 


 


Phosphorus   3.0 


 


Magnesium   2.3 


 


Vancomycin Pre-Dose   














  03/05/18





  11:49


 


WBC 


 


RBC 


 


Hgb 


 


Hct 


 


MCV 


 


MCH 


 


MCHC 


 


RDW 


 


Plt Count 


 


MPV 


 


Neutrophils % 


 


Lymphocytes % 


 


Monocytes % 


 


Eosinophils % 


 


Basophils % 


 


Sodium 


 


Potassium 


 


Chloride 


 


Carbon Dioxide 


 


Anion Gap 


 


BUN 


 


Creatinine 


 


POC Glucometer  123


 


Random Glucose 


 


Uric Acid 


 


Calcium 


 


Phosphorus 


 


Magnesium 


 


Vancomycin Pre-Dose 








 Microbiology





03/01/18 15:45   Blood - Peripheral Venous   Blood Culture - Preliminary


                            NO GROWTH OBTAINED AFTER 72 HOURS, INCUBATION TO 

CONTINUE


                            FOR 2 DAYS.


03/01/18 15:45   Blood - Peripheral Venous   Blood Culture - Preliminary


                            NO GROWTH OBTAINED AFTER 72 HOURS, INCUBATION TO 

CONTINUE


                            FOR 2 DAYS.


03/01/18 21:30   Urine - Urine Clean Catch   Urine Culture - Final





ASSESSMENT AND PLAN:





82 yom with HTN, DM, CAD s/p CABG/5 stents, ?CHF admitted with left diabetic 

foot





-Left diabetic foot with left second toe cellulitis+/- abscess and ?abscess of 

dorsum left foot


-Lactic acidosis, resolved


-CAD s/p CABG/PCI, ?CHF


-HTN


-NIDDM





Plan:


Zosyn/vancomycin day 4. ID input appreciated. Monitor vanco levels, 


uric acid normal. 


Left dorsum symptoms suspected from bony spur. 


MRI foot suspicious for early osteomyelitis. 


Follow up with ID/podiatry.


Hold off additional IVF for now. lactic acidosis resolved. 


Torsemide, K, phos supplementation. 


Hold metformin, ISS, diabetic diet. 


Continue ASA/statin/metoprolol. 


DVTPPx


dispo pending surgical plans and antibiotic needs. 


Plan discussed with patient in detail, all questions answered.

## 2018-03-05 NOTE — PN
Progress Note, Physician


History of Present Illness: 





Continues to C/O pain L great toe, 2nd toe


No fever/ chills








- Current Medication List


Current Medications: 


Active Medications





Acetaminophen (Tylenol -)  650 mg PO Q4H PRN


   PRN Reason: PAIN LEVEL 6-10


   Last Admin: 03/05/18 14:30 Dose:  650 mg


Aspirin (Asa -)  81 mg PO DAILY [facility]


   Last Admin: 03/05/18 09:47 Dose:  81 mg


Atorvastatin Calcium (Lipitor -)  40 mg PO HS [facility]


   Last Admin: 03/05/18 21:42 Dose:  40 mg


Carbidopa/Levodopa (Sinemet *Cr* 50/200 -)  1 combo PO TID [facility]


   Last Admin: 03/05/18 21:42 Dose:  1 combo


Donepezil HCl (Aricept -)  10 mg PO DAILY [facility]


   Last Admin: 03/05/18 09:47 Dose:  10 mg


Heparin Sodium (Porcine) (Heparin -)  5,000 unit SQ TID [facility]


   Last Admin: 03/05/18 21:42 Dose:  5,000 unit


Vancomycin HCl 1,000 mg/ (Dextrose)  250 mls @ 250 mls/hr IVPB Q12H [facility]


   PRN Reason: Protocol


   Last Admin: 03/05/18 15:27 Dose:  250 mls/hr


Piperacillin Sod/Tazobactam (Sod 3.375 gm/ Dextrose)  50 mls @ 100 mls/hr IVPB 

Q8H-IV [facility]


   PRN Reason: Protocol


   Last Admin: 03/05/18 18:18 Dose:  100 mls/hr


Insulin Aspart (Novolog Vial Sliding Scale -)  1 vial SQ ACHS [facility]


   PRN Reason: Protocol


   Last Admin: 03/05/18 21:20 Dose:  Not Given


Metoprolol Succinate (Toprol Xl -)  25 mg PO DAILY [facility]


   Last Admin: 03/05/18 09:47 Dose:  25 mg


Potassium Chloride (K-Dur -)  20 meq PO DAILY [facility]


   Last Admin: 03/05/18 09:47 Dose:  20 meq


Tamsulosin HCl (Flomax -)  0.4 mg PO DAILY@0830 [facility]


   Last Admin: 03/05/18 09:46 Dose:  0.4 mg


Torsemide (Demadex -)  20 mg PO DAILY [facility]


   Last Admin: 03/05/18 09:47 Dose:  20 mg











- Objective


Vital Signs: 


 Vital Signs











Temperature  97.8 F   03/05/18 22:35


 


Pulse Rate  70   03/05/18 22:35


 


Respiratory Rate  19   03/05/18 22:35


 


Blood Pressure  99/55   03/05/18 22:35


 


O2 Sat by Pulse Oximetry (%)  94 L  03/05/18 20:10











Constitutional: Yes: No Distress


Eyes: Yes: Conjunctiva Clear


Cardiovascular: Yes: Regular Rate and Rhythm, S1, S2


Respiratory: Yes: CTA Bilaterally


Gastrointestinal: Yes: Normal Bowel Sounds, Soft


Extremities: Yes: Other (decreased erythema/ swelling 2nd toe)


Labs: 


 CBC, BMP





 03/05/18 06:35 





 03/05/18 06:35 





 INR, PTT











INR  1.49  (0.82-1.09)  H  03/02/18  06:40    














Assessment/Plan





Cellulitis L 2nd toe- improved


Diabetes mellitus





 


Continue vanco/ zosyn


Check uric acid

## 2018-03-06 VITALS — DIASTOLIC BLOOD PRESSURE: 66 MMHG | SYSTOLIC BLOOD PRESSURE: 128 MMHG

## 2018-03-06 VITALS — TEMPERATURE: 98 F | HEART RATE: 83 BPM

## 2018-03-06 RX ADMIN — VANCOMYCIN HYDROCHLORIDE SCH MLS/HR: 1 INJECTION, POWDER, LYOPHILIZED, FOR SOLUTION INTRAVENOUS at 02:14

## 2018-03-06 RX ADMIN — HEPARIN SODIUM SCH UNIT: 5000 INJECTION, SOLUTION INTRAVENOUS; SUBCUTANEOUS at 06:00

## 2018-03-06 RX ADMIN — DONEPEZIL HYDROCHLORIDE SCH MG: 10 TABLET, FILM COATED ORAL at 09:20

## 2018-03-06 RX ADMIN — INSULIN ASPART SCH: 100 INJECTION, SOLUTION INTRAVENOUS; SUBCUTANEOUS at 06:01

## 2018-03-06 RX ADMIN — PIPERACILLIN SODIUM,TAZOBACTAM SODIUM SCH MLS/HR: 3; .375 INJECTION, POWDER, FOR SOLUTION INTRAVENOUS at 01:19

## 2018-03-06 RX ADMIN — INSULIN ASPART SCH: 100 INJECTION, SOLUTION INTRAVENOUS; SUBCUTANEOUS at 11:36

## 2018-03-06 RX ADMIN — POTASSIUM CHLORIDE SCH MEQ: 1500 TABLET, EXTENDED RELEASE ORAL at 09:20

## 2018-03-06 RX ADMIN — ASPIRIN 81 MG SCH MG: 81 TABLET ORAL at 09:20

## 2018-03-06 RX ADMIN — TORSEMIDE SCH MG: 20 TABLET ORAL at 09:20

## 2018-03-06 RX ADMIN — TAMSULOSIN HYDROCHLORIDE SCH MG: 0.4 CAPSULE ORAL at 08:30

## 2018-03-06 RX ADMIN — PIPERACILLIN SODIUM,TAZOBACTAM SODIUM SCH MLS/HR: 3; .375 INJECTION, POWDER, FOR SOLUTION INTRAVENOUS at 09:21

## 2018-03-06 NOTE — PN
Progress Note, Physician


History of Present Illness: 





Reports ledd foot pain


No fever/ chills


MRI findings reviewed


ESR 24  CRP 0.9





- Current Medication List


Current Medications: 


Active Medications





Acetaminophen (Tylenol -)  650 mg PO Q4H PRN


   PRN Reason: PAIN LEVEL 6-10


   Last Admin: 03/05/18 14:30 Dose:  650 mg


Aspirin (Asa -)  81 mg PO DAILY [facility]


   Last Admin: 03/06/18 09:20 Dose:  81 mg


Atorvastatin Calcium (Lipitor -)  40 mg PO HS [facility]


   Last Admin: 03/05/18 21:42 Dose:  40 mg


Carbidopa/Levodopa (Sinemet *Cr* 50/200 -)  1 combo PO TID [facility]


   Last Admin: 03/06/18 06:00 Dose:  1 combo


Donepezil HCl (Aricept -)  10 mg PO DAILY [facility]


   Last Admin: 03/06/18 09:20 Dose:  10 mg


Heparin Sodium (Porcine) (Heparin -)  5,000 unit SQ TID [facility]


   Last Admin: 03/06/18 06:00 Dose:  5,000 unit


Vancomycin HCl 1,000 mg/ (Dextrose)  250 mls @ 250 mls/hr IVPB Q12H [facility]


   PRN Reason: Protocol


   Last Admin: 03/06/18 02:14 Dose:  250 mls/hr


Piperacillin Sod/Tazobactam (Sod 3.375 gm/ Dextrose)  50 mls @ 100 mls/hr IVPB 

Q8H-IV [facility]


   PRN Reason: Protocol


   Last Admin: 03/06/18 09:21 Dose:  100 mls/hr


Insulin Aspart (Novolog Vial Sliding Scale -)  1 vial SQ ACHS [facility]


   PRN Reason: Protocol


   Last Admin: 03/06/18 11:36 Dose:  Not Given


Metoprolol Succinate (Toprol Xl -)  25 mg PO DAILY [facility]


   Last Admin: 03/06/18 09:20 Dose:  25 mg


Potassium Chloride (K-Dur -)  20 meq PO DAILY [facility]


   Last Admin: 03/06/18 09:20 Dose:  20 meq


Tamsulosin HCl (Flomax -)  0.4 mg PO DAILY@0830 [facility]


   Last Admin: 03/06/18 08:30 Dose:  0.4 mg


Torsemide (Demadex -)  20 mg PO DAILY [facility]


   Last Admin: 03/06/18 09:20 Dose:  20 mg











- Objective


Vital Signs: 


 Vital Signs











Temperature  99 F   03/06/18 09:00


 


Pulse Rate  78   03/06/18 09:00


 


Respiratory Rate  18   03/06/18 09:00


 


Blood Pressure  128/66   03/06/18 09:00


 


O2 Sat by Pulse Oximetry (%)  94 L  03/05/18 20:10











Constitutional: Yes: No Distress


Eyes: Yes: Conjunctiva Clear


Cardiovascular: Yes: Regular Rate and Rhythm, S1, S2


Respiratory: Yes: CTA Bilaterally


Gastrointestinal: Yes: Normal Bowel Sounds, Soft.  No: Tenderness


Extremities: Yes: Other (L 2nd to erythema/ swelling nearly all resolved)


Labs: 


 CBC, BMP





 03/05/18 06:35 





 03/05/18 06:35 





 INR, PTT











INR  1.49  (0.82-1.09)  H  03/02/18  06:40    














Assessment/Plan





Cellulitis L 2nd toe- improved


Diabetes mellitus





 


MRI findings equivocal


No clinical evidence for osteomyelitis


ESR 24  CRP 0.9





Substitute po Augmentin 875mg bid x 7d


Outpatient podiatry follow up

## 2018-03-06 NOTE — PN
Teaching Attending Note


Name of Resident: Celine Shay





ATTENDING PHYSICIAN STATEMENT





I saw and evaluated the patient.


I reviewed the resident's note and discussed the case with the resident.


I agree with the resident's findings and plan as documented.








SUBJECTIVE:continues to have intermittent pain on walking. denies CP, SOB, fever

, chills, N/V/C/D








OBJECTIVE:


 Last Vital Signs











Temp Pulse Resp BP Pulse Ox


 


 99 F   78   18   128/66   94 L


 


 03/06/18 09:00  03/06/18 09:00  03/06/18 09:00  03/06/18 09:00  03/05/18 20:10








General NAD


CV S1 S2 RRR no murmur/rub/gallop


Lungs CTA B/L No wheezing/rales/rhonchi


Extremities L foot 2nd digit is slightly erythematous, no tender on palpation. 

no skin breakage or oozing. pulse intact





ASSESSMENT AND PLAN:


83 yo M with HTN, DM, CAD s/p CABG/5 stents, ?CHF admitted with left diabetic 

foot


1. Left diabetic foot with left second toe cellulitis- MRI negative for OM. 

clinically improved. states he has has pain but no pain on exam. On Vanco/Zosyn 

x5days  can transition to augmetin po for 7 additional days. surgical shoe. can 

f/u with podiatry as outpatient. Cx negative


2. Lactic acidosis- resolved


3. CAD s/p CABG/PCI,-


4. HTN


5. NIDDM- can discharge on po. 


6. d/c home on po augmentin. can f/u with podiatry as outpatient for repeat 

imaging.

## 2018-03-06 NOTE — PN
Progress Note (short form)





- Note


Progress Note: 





Podiatry:





Seen/evaluated at bedside, NAD.  Pain to L foot persistent.  Denies F/V/N/C/SOB/

CP.  Afebrile, VSS.





YOLANDE:





L foot: dorsal 2nd PIPJ swelling with no fluctuance, minimal edema, digital 

erythema improved, no open wound, no drainage, no cellulitis, no signs of 

active infection.  Mild tenderness to palpation.  Moderate tenderness to 

palpation of dorsal midfoot.  Tenderness along course of anterior tibial tendon.





MRI L foot: diffuse swelling, faint BME base of middle phalanx, anterior tibial 

strain





Imp: 82 year old DM M with L 2nd digit hammer toe, anterior tibial tendon strain

, osteoarthritis


1. Patient can be discharged with surgical offloading shoe.  Attempting to 

obtain CAM walker for patient for protective weightbearing.


2. Glycemic control.


3. Pain control.


4. Upon discharge, will f/u with me in wound healing center, [phone].





ALICIA Holley DPM

## 2018-03-06 NOTE — DS
Physical Exam: 


SUBJECTIVE: Patient seen and examined.  Pt c/o pain to dorsum of Left foot.  

Otherwise, pt has no c/o.  Pt denies chest pain, sob, abdominal pain, fever, 

chills, nausea, vomiting, constipation, diarrhea. 





OBJECTIVE:





 Vital Signs











 Period  Temp  Pulse  Resp  BP Sys/Lowe  Pulse Ox


 


 Last 24 Hr  97.5 F-99 F    18-19  /55-76  94-94








PHYSICAL EXAM





GENERAL: The patient is awake, alert, and fully oriented, in no acute distress.


LUNGS: Breath sounds equal, clear to auscultation bilaterally, no wheezes, no 

crackles, no accessory muscle use. 


HEART: Regular rate and rhythm, S1, S2 without murmur, rub or gallop.


ABDOMEN: Soft, nontender, nondistended, normoactive bowel sounds, no guarding.


EXTREMITIES: 1+ jillian pulses, warm, well-perfused, no edema.  Left 2nd toe 

erythema improved, but swelling still present at Proximal Interphalangeal 

joint.  Dorsum of Left  to palpation with diffuse edema over dorsum.


PSYCH: Normal mood, normal affect.





LABS


 Laboratory Results - last 24 hr











  03/05/18 03/05/18 03/06/18





  17:23 21:19 05:58


 


POC Glucometer  166  98  103














  03/06/18





  11:33


 


POC Glucometer  127











HOSPITAL COURSE:





Date of Admission:03/02/18





Date of Discharge: 03/06/18





82M with PMH of DM, htn, CAD, presents with swelling in dorsum of Left foot and 

to Left 2nd toe, admitted for Left diabetic foot.  Not likely osteomyelitis per 

MRI.  Pt seen by Dr. Holley (Podiatry) and will be given either a surgical 

boot or a walker for ambulation.  Pt seen by Dr. Haley (Infectious Disease) and 

will go home with Augmentin for 7 days more.  Pt's NIDDM is well controlled.  





3/1/18 CXR -> no acute chest pathology


3/1/18 Left foot xray -> arthritic changes, no acute fracture or destructive 

bony process noted.


3/2/18 Left LE MRI -> soft tissue edema of Left foot extending into 2nd toe.  

Faint bone marrow edema of middle phalynx of 2nd toe and early osteomyelitis 

cannot be ruled out.





 Microbiology





03/01/18 15:45   Blood - Peripheral Venous   Blood Culture - Preliminary


                            NO GROWTH OBTAINED AFTER 96 HOURS, INCUBATION TO 

CONTINUE


                            FOR 1 DAYS.


03/01/18 15:45   Blood - Peripheral Venous   Blood Culture - Preliminary


                            NO GROWTH OBTAINED AFTER 96 HOURS, INCUBATION TO 

CONTINUE


                            FOR 1 DAYS.


03/01/18 21:30   Urine - Urine Clean Catch   Urine Culture - Final





Pt stable for discharge home.


Minutes to complete discharge: 35





Discharge Summary


Reason For Visit: CELLULITIS


Current Active Problems





Cellulitis (Acute)


Failure of outpatient treatment (Acute)


CAD (coronary artery disease) (Chronic)


Diabetes (Chronic)


HTN (hypertension) (Chronic)








Condition: Stable





- Instructions


Diet, Activity, Other Instructions: 


You were treated for cellulitis to the Left 2nd toe.  Use the surgical boot and/

or other equipment Dr. Holley (Podiatry) provided for you and instructed.





Changes to your Medications:


- Take Augmentin (antibiotic) twice daily for 7 days more.


- Take Motrin/Ibuprofen or Tylenol for foot pain.


- Continue your other home medications as prescribed.





Continue to eat a diabetic diet.


Increase physical activity as tolerated.





Follow-ups:


- schedule an appointment with your Primary Care Physician (Dr. Brooks) in 

1 week. You may need repeat imaging in your feet in a few weeks.


- schedule an appointment with your Podiatrist (Dr. Holley) in 1 week





Please return to the hospital immediately if you experience persistent or 

increased foot pain, foot swelling, fever, chills, chest pain, difficulty 

breathing, or for any medical emergency.


Referrals: 


Rohit Holley MD [Staff Physician] - 1 Week


Pito Brooks MD [Primary Care Provider] - 1 Week


Disposition: HOME





- Home Medications


Comprehensive Discharge Medication List: 


Ambulatory Orders





Metoprolol Succinate [Toprol XL -] 25 mg PO DAILY 04/15/15 


Potassium Chloride 20 meq PO DAILY 04/15/15 


Tamsulosin HCl 0.4 mg PO DAILY 04/15/15 


Aspirin 81 mg PO DAILY 03/01/18 


Atorvastatin Ca [Lipitor] 40 mg PO DAILY 03/01/18 


Carbidopa/Levodopa *Cr* 50/200 [Sinemet *Cr* 50/200 -] 1 tab PO TID 03/01/18 


Metformin HCl 500 mg PO TID 03/01/18 


Torsemide 20 mg PO DAILY 03/01/18 


Donepezil HCl [Aricept] 10 mg PO DAILY 03/02/18 


Amox-Tr/K Cl [Augmentin 875-125mg Tablet -] 1 tab PO BID@0800,1730 #14 tablet 03 /06/18 








This patient is new to me today: No


Emergency Visit: Yes


ED Registration Date: 03/02/18


Care time: The patient presented to the Emergency Department on the above date 

and was hospitalized for further evaluation of their emergent condition.


Critical Care patient: No





- Discharge Referral


Referred to [facility] Yes


Physician Referral: Pito Mims MD ([facility])

## 2018-04-14 ENCOUNTER — HOSPITAL ENCOUNTER (INPATIENT)
Dept: HOSPITAL 74 - JER | Age: 83
LOS: 5 days | Discharge: NURSING, REHAB OR LONG-TERM CARE | DRG: 57 | End: 2018-04-19
Attending: NURSE PRACTITIONER | Admitting: INTERNAL MEDICINE
Payer: COMMERCIAL

## 2018-04-14 VITALS — BODY MASS INDEX: 27.4 KG/M2

## 2018-04-14 DIAGNOSIS — Z79.84: ICD-10-CM

## 2018-04-14 DIAGNOSIS — M21.611: ICD-10-CM

## 2018-04-14 DIAGNOSIS — I25.10: ICD-10-CM

## 2018-04-14 DIAGNOSIS — E11.9: ICD-10-CM

## 2018-04-14 DIAGNOSIS — N40.0: ICD-10-CM

## 2018-04-14 DIAGNOSIS — G25.81: ICD-10-CM

## 2018-04-14 DIAGNOSIS — F09: ICD-10-CM

## 2018-04-14 DIAGNOSIS — F02.80: ICD-10-CM

## 2018-04-14 DIAGNOSIS — I45.10: ICD-10-CM

## 2018-04-14 DIAGNOSIS — M10.9: ICD-10-CM

## 2018-04-14 DIAGNOSIS — I11.0: ICD-10-CM

## 2018-04-14 DIAGNOSIS — Z96.652: ICD-10-CM

## 2018-04-14 DIAGNOSIS — E78.5: ICD-10-CM

## 2018-04-14 DIAGNOSIS — I25.5: ICD-10-CM

## 2018-04-14 DIAGNOSIS — I50.22: ICD-10-CM

## 2018-04-14 DIAGNOSIS — G20: Primary | ICD-10-CM

## 2018-04-14 DIAGNOSIS — Z95.1: ICD-10-CM

## 2018-04-14 LAB
ALBUMIN SERPL-MCNC: 3.4 G/DL (ref 3.4–5)
ALP SERPL-CCNC: 55 U/L (ref 45–117)
ALT SERPL-CCNC: < 6 U/L (ref 12–78)
ANION GAP SERPL CALC-SCNC: 5 MMOL/L (ref 8–16)
APPEARANCE UR: CLEAR
AST SERPL-CCNC: 17 U/L (ref 15–37)
BASOPHILS # BLD: 0.3 % (ref 0–2)
BILIRUB SERPL-MCNC: 0.9 MG/DL (ref 0.2–1)
BILIRUB UR STRIP.AUTO-MCNC: NEGATIVE MG/DL
BNP SERPL-MCNC: 877.54 PG/ML (ref 5–450)
BUN SERPL-MCNC: 18 MG/DL (ref 7–18)
CALCIUM SERPL-MCNC: 8.5 MG/DL (ref 8.5–10.1)
CHLORIDE SERPL-SCNC: 104 MMOL/L (ref 98–107)
CO2 SERPL-SCNC: 31 MMOL/L (ref 21–32)
COLOR UR: (no result)
CREAT SERPL-MCNC: 1 MG/DL (ref 0.7–1.3)
DEPRECATED RDW RBC AUTO: 14.5 % (ref 11.9–15.9)
EOSINOPHIL # BLD: 0.3 % (ref 0–4.5)
GLUCOSE SERPL-MCNC: 121 MG/DL (ref 74–106)
HCT VFR BLD CALC: 38 % (ref 35.4–49)
HGB BLD-MCNC: 12.6 GM/DL (ref 11.7–16.9)
INR BLD: 1.28 (ref 0.82–1.09)
KETONES UR QL STRIP: NEGATIVE
LEUKOCYTE ESTERASE UR QL STRIP.AUTO: NEGATIVE
LYMPHOCYTES # BLD: 17.9 % (ref 8–40)
MCH RBC QN AUTO: 29.1 PG (ref 25.7–33.7)
MCHC RBC AUTO-ENTMCNC: 33.2 G/DL (ref 32–35.9)
MCV RBC: 87.8 FL (ref 80–96)
MONOCYTES # BLD AUTO: 11.5 % (ref 3.8–10.2)
NEUTROPHILS # BLD: 70 % (ref 42.8–82.8)
NITRITE UR QL STRIP: NEGATIVE
PH UR: 7 [PH] (ref 5–8)
PLATELET # BLD AUTO: 168 K/MM3 (ref 134–434)
PMV BLD: 8.4 FL (ref 7.5–11.1)
POTASSIUM SERPLBLD-SCNC: 3.6 MMOL/L (ref 3.5–5.1)
PROT SERPL-MCNC: 6.7 G/DL (ref 6.4–8.2)
PROT UR QL STRIP: NEGATIVE
PROT UR QL STRIP: NEGATIVE
PT PNL PPP: 14.5 SEC (ref 9.98–11.88)
RBC # BLD AUTO: 4.32 M/MM3 (ref 4–5.6)
SODIUM SERPL-SCNC: 140 MMOL/L (ref 136–145)
SP GR UR: 1.01 (ref 1–1.03)
UROBILINOGEN UR STRIP-MCNC: NEGATIVE MG/DL (ref 0.2–1)
WBC # BLD AUTO: 6.4 K/MM3 (ref 4–10)

## 2018-04-14 PROCEDURE — G0378 HOSPITAL OBSERVATION PER HR: HCPCS

## 2018-04-14 NOTE — PDOC
Attending Attestation





- HPI


HPI: 





The patient is an 82 year old male accompanied with his wife, with a 

significant PMH of DM, HTN, BPH, CAD s/p CABG (2010), and recent hospital 

discharge on 3/6/18 for cellulitis of left 2nd toe with augmentin for 7 days, 

presents to the ED for evaluation of bilateral leg swelling, leg weakness, and 

leg pain beginning one week ago. The patient reports not being able to bear 

weight with his legs and describes the pain as a constant dull pain, ranked 8/

10 in severity. He describes the pain is severe enough to prevent him from 

sleeping. He admits taking tylenol and motrin, but denies any alleviation in 

pain. Of note, the patient uses a walker at baseline, but has not been able to 

stand or walk at any point this week, which prompted his visit to the emergency 

department. 





The patient denies chest pain, dyspnea, orthopnea, headache, and dizziness.


Denies fevers, chills, nausea, vomiting, diarrhea, and constipation.


Denies dysuria, frequency, urgency, and hematuria.





Allergies: NKA


Past surgical history: CABG (2010)


Social history: No reported cigarette, alcohol, or drug use.


PCP: Dr. Pito Brooks ([phone])











<Bhumika Quezada - Last Filed: 04/14/18 12:25>





- Resident


Resident Name: Gina Garrido





- ED Attending Attestation


I have performed the following: I have examined & evaluated the patient, The 

case was reviewed & discussed with the resident, I agree w/resident's findings 

& plan, Exceptions are as noted





- Physicial Exam


PE: 





GENERAL: Awake, alert, and fully oriented, in no acute distress


HEAD: No signs of trauma


EYES: PERRLA, EOMI, sclera anicteric, conjunctiva clear


ENT: Auricles normal inspection, hearing grossly normal, nares patent, 

oropharynx clear without exudates. Moist mucosa


NECK: Normal ROM, supple, no lymphadenopathy, JVD, or masses


LUNGS: Breath sounds equal, clear to auscultation bilaterally.  No wheezes, and 

no crackles


HEART: Regular rate and rhythm, normal S1 and S2, no murmurs, rubs or gallops


ABDOMEN: Soft, nontender, normoactive bowel sounds.  No guarding, no rebound.  

No masses


EXTREMITIES: Normal range of motion, 4+ pitting edema to BLE.  No clubbing or 

cyanosis. No cords, erythema, or tenderness


NEUROLOGICAL: Cranial nerves II through XII grossly intact.  Normal speech. 

Motor and sensation intact. Unable to ambulate due to B/L foot/leg pain.


SKIN: Warm, Dry, normal turgor, no rashes or lesions noted.











- Medical Decision Making





Pt did not initially state he had history of CHF, however, he is on torsemide. 

No recent med changes. He is significantly uncomfortable with his legs, they 

are severely edematous. Unable to ambulate. Will give lasix in ED and reassess. 

If he is still unable to ambulate will admit. 








<Candy Ayala - Last Filed: 04/14/18 16:42>





**Heart Score/ECG Review





- ECG Impressions


Comment:: 





EKG read 12:09- Sinus rhythm 94 bpm with PVCs in 3:1 pattern. +RBBB. Similar to 

prior EKG. 








<Candy Ayala - Last Filed: 04/14/18 16:42>





Attestations





- Attestations








Documentation prepared by Bhumika Quezada, acting as medical scribe for Candy Ayala MD.





<Bhumika Quezada - Last Filed: 04/14/18 12:25>

## 2018-04-14 NOTE — HP
CHIEF COMPLAINT: Bilateral leg pain and swelling





PCP: Dr. Brooks


Cardiology: Dr. Sherry Lu





HISTORY OF PRESENT ILLNESS:


82 year-old male with a PMH significant for HTN, CAD s/p CABG x 5, NIDDM, 

possible osteomyelitis of left foot, Parkinson's disease and dementia. Patient 

presented today with a complaint of bilateral leg pain and swelling and 

inability to walk or weight bear. Patient states he has had these symptoms for 

two months, his wife says one week. He is on high-dose diuretics at home 

although he does not appear to have a previous history of heart failure. 

Patient was hospitalized from 3/1-3/6 for cellulitis and an MRI on 3/2 showed 

possible osteo of left foot. Patient has been followed by Dr. Holley with 

whom patient has an appointment on 4/17.  





ER course was notable for:


(1) US BLE: negative for DVT





Recent Travel:


No





PAST MEDICAL HISTORY:


Hypertension


Coronary artery disease


NIDDM


Possible osteomyelitis left foot





PAST SURGICAL HISTORY:


CABG x 5v


Left total knee replacement





Social History:


Smoking: no


Alcohol: no


Drugs: no





Family History: non-contributory





Allergies


No Known Allergies Allergy (Verified 04/14/18 11:21)


 








HOME MEDICATIONS:


 Home Medications











 Medication  Instructions  Recorded


 


Metoprolol Succinate [Toprol XL -] 25 mg PO DAILY 04/15/15


 


Potassium Chloride 20 meq PO DAILY 04/15/15


 


Tamsulosin HCl 0.4 mg PO DAILY 04/15/15


 


Aspirin 81 mg PO DAILY 03/01/18


 


Atorvastatin Ca [Lipitor] 40 mg PO DAILY 03/01/18


 


Carbidopa/Levodopa *Cr* 50/200 1 tab PO TID 03/01/18





[Sinemet *Cr* 50/200 -]  


 


Metformin HCl 500 mg PO TID 03/01/18


 


Torsemide 100 mg PO DAILY 03/01/18


 


Donepezil HCl [Aricept] 10 mg PO DAILY 03/02/18


 


Amox-Tr/K Cl [Augmentin 875-125mg 1 tab PO BID@0800,1730 #14 tablet 03/06/18





Tablet -]  








REVIEW OF SYSTEMS


CONSTITUTIONAL: 


Absent:  fever, chills, diaphoresis, generalized weakness, malaise, loss of 

appetite, weight change


HEENT: 


Absent:  rhinorrhea, nasal congestion, throat pain, throat swelling, difficulty 

swallowing, mouth swelling, ear pain, eye pain, visual changes


CARDIOVASCULAR: 


+bilateral lower extremity pain and swelling 


Absent: chest pain, syncope, palpitations, irregular heart rate, lightheadedness

, peripheral edema


RESPIRATORY: 


Absent: cough, shortness of breath, dyspnea with exertion, orthopnea, wheezing, 

stridor, hemoptysis


GASTROINTESTINAL:


Absent: abdominal pain, abdominal distension, nausea, vomiting, diarrhea, 

constipation, melena, hematochezia


GENITOURINARY: 


Absent: dysuria, frequency, urgency, hesitancy, hematuria, flank pain, genital 

pain


MUSCULOSKELETAL: 


+left foot pain


Absent: myalgia, arthralgia, joint swelling, back pain, neck pain


SKIN: 


Absent: rash, itching, pallor


HEMATOLOGIC/IMMUNOLOGIC: 


Absent: easy bleeding, easy bruising, lymphadenopathy, frequent infections


ENDOCRINE:


Absent: unexplained weight gain, unexplained weight loss, heat intolerance, 

cold intolerance


NEUROLOGIC: 


Absent: headache, focal weakness or paresthesias, dizziness, unsteady gait, 

seizure, mental status changes, bladder or bowel incontinence


PSYCHIATRIC: 


Absent: anxiety, depression, suicidal or homicidal ideation, hallucinations.








PHYSICAL EXAMINATION


 Vital Signs - 24 hr











  04/14/18 04/14/18 04/14/18





  11:25 11:51 15:31


 


Temperature 97.3 F L  97.9 F


 


Pulse Rate 93 H 78 


 


Pulse Rate [   85





Left Apical]   


 


Respiratory 18  16





Rate   


 


Blood Pressure 126/92  


 


Blood Pressure   110/52





[Left Arm]   


 


O2 Sat by Pulse 97 96 96





Oximetry (%)   














  04/14/18





  16:16


 


Temperature 


 


Pulse Rate 


 


Pulse Rate [ 85





Left Apical] 


 


Respiratory 16





Rate 


 


Blood Pressure 


 


Blood Pressure 101/51





[Left Arm] 


 


O2 Sat by Pulse 97





Oximetry (%) 











GENERAL: Awake, alert, and fully oriented, in no acute distress.


HEAD: Normal with no signs of trauma.


EYES: Pupils equal, round and reactive to light, extraocular movements intact, 

sclera anicteric, conjunctiva clear. No lid lag.


EARS, NOSE, THROAT: Ears normal, nares patent, oropharynx clear without 

exudates. Moist mucous membranes.


NECK: Normal range of motion, supple without lymphadenopathy, JVD, or masses.


LUNGS: Breath sounds equal, clear to auscultation bilaterally. No wheezes, and 

no crackles. No accessory muscle use.


HEART: Regular rate and rhythm, normal S1 and S2 


ABDOMEN: Soft, nontender, not distended, normoactive bowel sounds, no guarding, 

no rebound, no masses.   


MUSCULOSKELETAL: Normal range of motion at all joints. No bony deformities or 

tenderness. No CVA tenderness.


UPPER EXTREMITIES: 2+ pulses, warm, well-perfused. No cyanosis. No clubbing. No 

peripheral edema.


LOWER EXTREMITIES: 3+ bilateral edema extending from knees to toes, venous 

stasis changes; no erythema; no apparent break in skin; no calf-tenderness


NEUROLOGICAL:  Cranial nerves II-XII intact. Normal speech. 








 Laboratory Results - last 24 hr











  04/14/18 04/14/18 04/14/18





  12:32 12:32 12:32


 


WBC  6.4  


 


RBC  4.32  


 


Hgb  12.6  


 


Hct  38.0  


 


MCV  87.8  


 


MCH  29.1  


 


MCHC  33.2  


 


RDW  14.5  


 


Plt Count  168  


 


MPV  8.4  


 


Neutrophils %  70.0  


 


Lymphocytes %  17.9  D  


 


Monocytes %  11.5 H  


 


Eosinophils %  0.3  D  


 


Basophils %  0.3  


 


PT with INR   


 


INR   


 


PTT (Actin FS)    28.2


 


Sodium   140 


 


Potassium   3.6 


 


Chloride   104 


 


Carbon Dioxide   31 


 


Anion Gap   5 L 


 


BUN   18  D 


 


Creatinine   1.0 


 


Creat Clearance w eGFR   > 60 


 


Random Glucose   121 H D 


 


Lactic Acid   


 


Calcium   8.5 


 


Total Bilirubin   0.9  D 


 


AST   17  D 


 


ALT   < 6 L D 


 


Alkaline Phosphatase   55 


 


Troponin I   


 


B-Natriuretic Peptide   


 


Total Protein   6.7  D 


 


Albumin   3.4  D 


 


Urine Color   


 


Urine Appearance   


 


Urine pH   


 


Ur Specific Gravity   


 


Urine Protein   


 


Urine Glucose (UA)   


 


Urine Ketones   


 


Urine Blood   


 


Urine Nitrite   


 


Urine Bilirubin   


 


Urine Urobilinogen   


 


Ur Leukocyte Esterase   














  04/14/18 04/14/18 04/14/18





  12:32 12:32 12:32


 


WBC   


 


RBC   


 


Hgb   


 


Hct   


 


MCV   


 


MCH   


 


MCHC   


 


RDW   


 


Plt Count   


 


MPV   


 


Neutrophils %   


 


Lymphocytes %   


 


Monocytes %   


 


Eosinophils %   


 


Basophils %   


 


PT with INR   14.50 H 


 


INR   1.28 H 


 


PTT (Actin FS)   


 


Sodium   


 


Potassium   


 


Chloride   


 


Carbon Dioxide   


 


Anion Gap   


 


BUN   


 


Creatinine   


 


Creat Clearance w eGFR   


 


Random Glucose   


 


Lactic Acid  1.3  


 


Calcium   


 


Total Bilirubin   


 


AST   


 


ALT   


 


Alkaline Phosphatase   


 


Troponin I   


 


B-Natriuretic Peptide   


 


Total Protein   


 


Albumin   


 


Urine Color    Straw


 


Urine Appearance    Clear


 


Urine pH    7.0  D


 


Ur Specific Gravity    1.006


 


Urine Protein    Negative


 


Urine Glucose (UA)    Negative


 


Urine Ketones    Negative


 


Urine Blood    Negative


 


Urine Nitrite    Negative


 


Urine Bilirubin    Negative


 


Urine Urobilinogen    Negative


 


Ur Leukocyte Esterase    Negative














  04/14/18





  12:32


 


WBC 


 


RBC 


 


Hgb 


 


Hct 


 


MCV 


 


MCH 


 


MCHC 


 


RDW 


 


Plt Count 


 


MPV 


 


Neutrophils % 


 


Lymphocytes % 


 


Monocytes % 


 


Eosinophils % 


 


Basophils % 


 


PT with INR 


 


INR 


 


PTT (Actin FS) 


 


Sodium 


 


Potassium 


 


Chloride 


 


Carbon Dioxide 


 


Anion Gap 


 


BUN 


 


Creatinine 


 


Creat Clearance w eGFR 


 


Random Glucose 


 


Lactic Acid 


 


Calcium 


 


Total Bilirubin 


 


AST 


 


ALT 


 


Alkaline Phosphatase 


 


Troponin I  < 0.02


 


B-Natriuretic Peptide  877.54 H


 


Total Protein 


 


Albumin 


 


Urine Color 


 


Urine Appearance 


 


Urine pH 


 


Ur Specific Gravity 


 


Urine Protein 


 


Urine Glucose (UA) 


 


Urine Ketones 


 


Urine Blood 


 


Urine Nitrite 


 


Urine Bilirubin 


 


Urine Urobilinogen 


 


Ur Leukocyte Esterase 











ASSESSMENT/PLAN


82 year-old man with a PMH significant for HTN, CAD s/p CABG x 5v, NIDDM, and 

possible osteomyelitis of left foot. Placed on observation for bilateral lower 

extremity edema, pain, and inability to weight bear.





r/o CHF


   --bilateral lower extremity edema, BNP mildly elevated


   --lower extremity edema was noted during previous hospitalization in March 2018 but it does not appear there was a cardiac workup


   --echo ordered 


   --continue home dose torsemide 100mg for now


   --strict I&Os, daily weights


      


Hypertension


   --BP stable


   --continue Toprol XL, Torsemide





Coronary artery disease


   --continue Toprol XL, Lipitor, ASA





NIDDM


   --Novolog sliding scale coverage





Possible osteomyelitis left foot


   --presently off antibiotics


   --request Dr. Holley consult





FEN


   Fluids: PO intake adequate


   Electrolytes: replete as indicated


   Nutrition: low sodium diabetic





DVT prophylaxis: subq heparin





Physical therapy





Dispo: continues to require observation. Full code.








Visit type





- Emergency Visit


Emergency Visit: Yes


ED Registration Date: 04/14/18


Care time: The patient presented to the Emergency Department on the above date 

and was hospitalized for further evaluation of their emergent condition.





- New Patient


This patient is new to me today: Yes


Date on this admission: 04/15/18





- Critical Care


Critical Care patient: No





Hospitalist Screening





- Colonoscopy Questionnaire


Colonoscopy Questionnaire: 





Colonoscopy Questionnaire








-   Patient:


50 - 75 years old and never had a screening colonoscopy: Unknown


History of colon or rectal polyps, or CA: Unknown


History of IBD, Crohn's disease or UC: Unknown


History of abdominal radiation therapy as a child: Unknown





-   Relative:


1 with colon or rectal CA, or polyps at age 60 or younger: Unknown


Colon or rectal CA diagnosed at age 45 or younger: Unknown


Multiple relatives with colon or rectal CA: Unknown





-   Outcome:


Screening Result: Negative Screen

## 2018-04-14 NOTE — PDOC
History of Present Illness





- General


Chief Complaint: Pain, Acute


Stated Complaint: LEG PAIN


Time Seen by Provider: 04/14/18 11:27


History Source: Patient, Spouse


Exam Limitations: No Limitations





- History of Present Illness


Initial Comments: 


Pt is an 81 yo M with PMHx of DM, HTN, CAD s/p CABG (2008), recent hospital 

discharge 3/6/18 for cellulitis of L 2nd toe with augmentin x 7 days now 

presenting with one week hx of bilateral leg swelling and inability to bear 

weight on legs. The patient describes an 8/10 constant dull pain, worse on 

bearing weight, severe enough to keep him awake at night.  They have used 

tylenol and motrin with limited improvement. At baseline, pt walks with a walker

, but has been unable to bear any weight at all over the past week. No SOB, no 

worsening orthopnea, no cough, no increase in weight, no early satiety, no 

fevers.  No hematuria, hematochezia, or palpitations. Pt's wife said they were 

scheduled for US of the lower limbs today, but since he was unable to walk, 

they came into the emergency room.





04/14/18 12:02





04/14/18 12:04





Timing/Duration: getting worse


Severity: moderate


Modifying Factors: improves with: rest


Associated Symptoms: denies: chest pain, cough, fever/chills, loss of appetite, 

nausea/vomiting, shortness of breath, syncope, weakness





Past History





- Travel


Traveled outside of the country in the last 30 days: No


Close contact w/someone who was outside of country & ill: No





- Past Medical History


Allergies/Adverse Reactions: 


 Allergies











Allergy/AdvReac Type Severity Reaction Status Date / Time


 


No Known Allergies Allergy   Verified 04/14/18 11:21











Home Medications: 


Ambulatory Orders





Metoprolol Succinate [Toprol XL -] 25 mg PO DAILY 04/15/15 


Potassium Chloride 20 meq PO DAILY 04/15/15 


Tamsulosin HCl 0.4 mg PO DAILY 04/15/15 


Aspirin 81 mg PO DAILY 03/01/18 


Atorvastatin Ca [Lipitor] 40 mg PO DAILY 03/01/18 


Carbidopa/Levodopa *Cr* 50/200 [Sinemet *Cr* 50/200 -] 1 tab PO TID 03/01/18 


Metformin HCl 500 mg PO TID 03/01/18 


Torsemide 100 mg PO DAILY 03/01/18 


Donepezil HCl [Aricept] 10 mg PO DAILY 03/02/18 


Amox-Tr/K Cl [Augmentin 875-125mg Tablet -] 1 tab PO BID@0800,1730 #14 tablet 03 /06/18 








Anemia: No


Asthma: No


Cancer: No


Cardiac Disorders: Yes (CAD)


CVA: No


COPD: No


CHF: No


Dementia: No


Diabetes: No


GI Disorders: No


 Disorders: Yes (BPH)


HTN: Yes


Hypercholesterolemia: Yes


Liver Disease: No


Seizures: No


Thyroid Disease: No





- Surgical History


Cardiac Surgery: Yes (CABG 5/2010)





- Immunization History


Immunization Up to Date: Yes





- Suicide/Smoking/Psychosocial Hx


Smoking History: Never smoked


Have you smoked in the past 12 months: No


Hx Alcohol Use: No


Drug/Substance Use Hx: No


Substance Use Type: None


Hx Substance Use Treatment: No





**Review of Systems





- Review of Systems


Able to Perform ROS?: Yes


Is the patient limited English proficient: No


Constitutional: No: Chills, Fever


HEENTM: No: Nose Congestion


Respiratory: No: Cough, Shortness of Breath, SOB with Exertion, Wheezing


Cardiac (ROS): Yes: Edema (bilateral leg swelling).  No: Chest Pain


ABD/GI: No: Abdominal Distended, Blood Streaked Bowels, Nausea, Vomiting


: No: Burning


Musculoskeletal: Yes: Other (painful r ankle, painful L 2nd toe, ).  No: Back 

Pain


Integumentary: No: Bruising


Neurological: No: Headache, Numbness, Seizure





*Physical Exam





- Physical Exam


General Appearance: Yes: Appropriately Dressed, Mild Distress


HEENT: negative: Pale Conjunctivae, Pharyngeal Erythema


Neck: positive: Supple


Respiratory/Chest: positive: Lungs Clear, Normal Breath Sounds.  negative: 

Chest Tender, Respiratory Distress, Labored Respiration, Stridor, Wheezing


Cardiovascular: positive: Regular Rate, S1, S2.  negative: Murmur


Gastrointestinal/Abdominal: positive: Normal Bowel Sounds, Soft


Musculoskeletal: positive: Other (Tender R ankle, tender L 2nd toe).  negative: 

Vertebral Tenderness


Extremity: positive: Tender (warm feet bilaterally, palpable dorsalis pedis 1+ 

bilaterally, 0.5cm hard elevation over L 2nd toe ), Pedal Edema (bilateral , 

above ankles), Erythema.  negative: Coldness, Calf Tenderness


Integumentary: positive: Warm


Neurologic: positive: Fully Oriented, Alert, Motor Strength 5/5 (Upper limbs 

blaterally, hip/knee joints bilateral), Sensory Deficit (reduced touch sensation

, medially both lower limbs).  negative: EOM Palsy, Facial Droop





**Heart Score/ECG Review





- Electrocardiogram


EKG: Non specific repolarization disturbance (mutiple PVCs previously documented

)





- Age


Age: >/= 65





- Risk Factors


Risk Factors Heart Score: Yes Hx Hypertension, Yes Hx Diabetes, Yes Hx Obesity





- ECG Intrepretation


Rhythm: PVC(s) (previously documented)





- Axis


Axis: Normal





- P and WI


Delta Wave(s) Present: No


WPW: No





- QRS


Widened: RBBB (previously documented)


Poor R Wave Progression: Yes


Q Wave Present: No





- ST and T


Non Specific ST-T Wave changes: Yes


Prolonged Q-T Interval: No





- ECG Impressions


Normal ECG: Yes


Non-specific ST Elevation: Yes


Ischemic Changes: No


Bradycardia: No


Torsades cynthia Pointes: No


WPW: No


Comment:: 


ventricular rate 94/min


QT/QTc- 446/557 ( probably incorporating the PVCs)


Sinus rythm with frequent PVCs, RBBB, inferior infarct undetermined (previously 

documented RBBB and PVCs)


Pt had [id number] ago, no chest pain or SOB symptoms


04/14/18 12:44








ED Treatment Course





- LABORATORY


CBC & Chemistry Diagram: 


 04/14/18 12:32





 04/14/18 12:32





Medical Decision Making





- Medical Decision Making





04/14/18 12:48


Patient has bilateral leg swelling and reduced mobility for the past week with 

tenderness bilaterally


Will do duplex of both LE


MRI done during admission 3/6/18 could not R/O osteomyelitis of L 2nd toe, will 

do Xray of L foot


Will Do sepsis screen since patient may have osteomyelitis, although no fever, 

chills - blood culture, CBC, lactate, trops, PT/PTT, urinalysis, EKG


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





04/14/18 15:11


CXR - no evidence of acute pathology


Duplex -bilat LLE- no evidence of DVTs, no acute pathology


BMP- 800, hx of SOB is equivocal. Pt denies, but spouse describes SOB


After morphine 2mg, Pt was still unable to bear weight on the R foot


With the bilateral pedal edema, we need to R/O CHF 


Pt will benefit from an ECHO


No cardiology note noted in chart but pt says Dr Lu is cardiologist.


Call was made to Dr Lu, on call Dr Nunez to call back 


Plan is to discuss likelihood of CHF, since pt is on torsemide at home, 

although BMP is over 800 and what the latest ECHO showed


04/14/18 16:32





Spoke with Dr Nunez who does not know the patient, he is open to lasix iv





04/14/18 16:42


D/W Dr Ayala- Pt is hypertensive and unable to ambulate independently at 

this time. Needs to be further investigated and managed.


Will admit to hospitalist service. We provided the patient with a walker and he 

had significant L foot pain while attempting to bear weight.


04/14/18 18:12





04/14/18 18:14


No evidence of cellulitis


Xray of LLE- showed no osteomyelitis. 





*DC/Admit/Observation/Transfer


Diagnosis at time of Disposition: 


 Localized swelling of both lower legs








- Discharge Dispostion


Admit: Yes


Decision to Admit order Date/Time: 


Patient is unable to ambulate. He is unable to put pressure on the L foot due 

to pain but is currently not short of breath. There is a lot of discrepancy 

between the patient's history and that of the wife. She insists he is SOB but 

no clinical evidence of acute exacerbation of CHF at the moment except for 

bilateral pedal edema. Will go ahead and admit to hospitalist team for further 

evaluation.


04/14/18 18:05








- Referrals


Referrals: 


Pito Brooks MD [Primary Care Provider] - 





- Patient Instructions





- Post Discharge Activity





- Attestations


Physician Attestion: 





04/14/18 18:15


Gina Garrido MD

## 2018-04-15 LAB
ALBUMIN SERPL-MCNC: 2.7 G/DL (ref 3.4–5)
ALP SERPL-CCNC: 41 U/L (ref 45–117)
ALT SERPL-CCNC: < 6 U/L (ref 12–78)
ANION GAP SERPL CALC-SCNC: 4 MMOL/L (ref 8–16)
AST SERPL-CCNC: 17 U/L (ref 15–37)
BASOPHILS # BLD: 0.5 % (ref 0–2)
BILIRUB SERPL-MCNC: 1.1 MG/DL (ref 0.2–1)
BUN SERPL-MCNC: 17 MG/DL (ref 7–18)
CALCIUM SERPL-MCNC: 8.1 MG/DL (ref 8.5–10.1)
CHLORIDE SERPL-SCNC: 106 MMOL/L (ref 98–107)
CO2 SERPL-SCNC: 31 MMOL/L (ref 21–32)
CREAT SERPL-MCNC: 0.9 MG/DL (ref 0.7–1.3)
DEPRECATED RDW RBC AUTO: 14.5 % (ref 11.9–15.9)
EOSINOPHIL # BLD: 0.7 % (ref 0–4.5)
GLUCOSE SERPL-MCNC: 110 MG/DL (ref 74–106)
HCT VFR BLD CALC: 35.1 % (ref 35.4–49)
HGB BLD-MCNC: 12.1 GM/DL (ref 11.7–16.9)
LYMPHOCYTES # BLD: 24.7 % (ref 8–40)
MAGNESIUM SERPL-MCNC: 2.2 MG/DL (ref 1.8–2.4)
MCH RBC QN AUTO: 30.2 PG (ref 25.7–33.7)
MCHC RBC AUTO-ENTMCNC: 34.5 G/DL (ref 32–35.9)
MCV RBC: 87.4 FL (ref 80–96)
MONOCYTES # BLD AUTO: 11.8 % (ref 3.8–10.2)
NEUTROPHILS # BLD: 62.3 % (ref 42.8–82.8)
PLATELET # BLD AUTO: 164 K/MM3 (ref 134–434)
PMV BLD: 8.2 FL (ref 7.5–11.1)
POTASSIUM SERPLBLD-SCNC: 3.6 MMOL/L (ref 3.5–5.1)
PROT SERPL-MCNC: 5.7 G/DL (ref 6.4–8.2)
RBC # BLD AUTO: 4.02 M/MM3 (ref 4–5.6)
SODIUM SERPL-SCNC: 141 MMOL/L (ref 136–145)
WBC # BLD AUTO: 6.1 K/MM3 (ref 4–10)

## 2018-04-15 RX ADMIN — HEPARIN SODIUM SCH UNIT: 5000 INJECTION, SOLUTION INTRAVENOUS; SUBCUTANEOUS at 00:48

## 2018-04-15 RX ADMIN — HEPARIN SODIUM SCH UNIT: 5000 INJECTION, SOLUTION INTRAVENOUS; SUBCUTANEOUS at 06:14

## 2018-04-15 RX ADMIN — INSULIN ASPART SCH: 100 INJECTION, SOLUTION INTRAVENOUS; SUBCUTANEOUS at 22:34

## 2018-04-15 RX ADMIN — INSULIN ASPART SCH: 100 INJECTION, SOLUTION INTRAVENOUS; SUBCUTANEOUS at 12:04

## 2018-04-15 RX ADMIN — ASPIRIN 81 MG SCH MG: 81 TABLET ORAL at 09:49

## 2018-04-15 RX ADMIN — POTASSIUM CHLORIDE SCH MEQ: 1500 TABLET, EXTENDED RELEASE ORAL at 09:49

## 2018-04-15 RX ADMIN — HEPARIN SODIUM SCH UNIT: 5000 INJECTION, SOLUTION INTRAVENOUS; SUBCUTANEOUS at 13:54

## 2018-04-15 RX ADMIN — TAMSULOSIN HYDROCHLORIDE SCH MG: 0.4 CAPSULE ORAL at 09:49

## 2018-04-15 RX ADMIN — TORSEMIDE SCH: 100 TABLET ORAL at 09:54

## 2018-04-15 RX ADMIN — INSULIN ASPART SCH: 100 INJECTION, SOLUTION INTRAVENOUS; SUBCUTANEOUS at 00:49

## 2018-04-15 RX ADMIN — HEPARIN SODIUM SCH UNIT: 5000 INJECTION, SOLUTION INTRAVENOUS; SUBCUTANEOUS at 22:34

## 2018-04-15 RX ADMIN — ATORVASTATIN CALCIUM SCH MG: 40 TABLET, FILM COATED ORAL at 22:30

## 2018-04-15 RX ADMIN — INSULIN ASPART SCH: 100 INJECTION, SOLUTION INTRAVENOUS; SUBCUTANEOUS at 17:22

## 2018-04-15 RX ADMIN — DONEPEZIL HYDROCHLORIDE SCH MG: 10 TABLET, FILM COATED ORAL at 22:31

## 2018-04-15 RX ADMIN — INSULIN ASPART SCH: 100 INJECTION, SOLUTION INTRAVENOUS; SUBCUTANEOUS at 06:55

## 2018-04-15 NOTE — PN
Physical Exam: 


SUBJECTIVE: Patient seen and examined at bedside. States his leg pain is not 

bad but both feet hurt him when he tries to walk.








OBJECTIVE:





 Vital Signs











 Period  Temp  Pulse  Resp  BP Sys/Lowe  Pulse Ox


 


 Last 24 Hr  97.9 F-99.3 F  76-98  16-24  101-118/51-72  96-98











GENERAL: The patient is awake, alert, and fully oriented, in no acute distress.


LUNGS: Breath sounds equal, clear to auscultation bilaterally, no wheezes, no 

crackles, no accessory muscle use. 


HEART: Regular rate and rhythm, S1, S2 


ABDOMEN: Soft, nontender, nondistended, normoactive bowel sounds, no guarding, 

no rebound


EXTREMITIES: 3+bilateral lower extremity edema; tenderness both ankles and feet

  


NEUROLOGICAL: Cranial nerves II through XII grossly intact. Normal speech, gait 

not observed.








 Laboratory Results - last 24 hr











  04/14/18 04/14/18 04/14/18





  12:32 12:32 12:32


 


WBC  6.4  


 


RBC  4.32  


 


Hgb  12.6  


 


Hct  38.0  


 


MCV  87.8  


 


MCH  29.1  


 


MCHC  33.2  


 


RDW  14.5  


 


Plt Count  168  


 


MPV  8.4  


 


Neutrophils %  70.0  


 


Lymphocytes %  17.9  D  


 


Monocytes %  11.5 H  


 


Eosinophils %  0.3  D  


 


Basophils %  0.3  


 


PT with INR   


 


INR   


 


PTT (Actin FS)    28.2


 


Sodium   140 


 


Potassium   3.6 


 


Chloride   104 


 


Carbon Dioxide   31 


 


Anion Gap   5 L 


 


BUN   18  D 


 


Creatinine   1.0 


 


Creat Clearance w eGFR   > 60 


 


POC Glucometer   


 


Random Glucose   121 H D 


 


Lactic Acid   


 


Calcium   8.5 


 


Magnesium   


 


Total Bilirubin   0.9  D 


 


AST   17  D 


 


ALT   < 6 L D 


 


Alkaline Phosphatase   55 


 


Troponin I   


 


B-Natriuretic Peptide   


 


Total Protein   6.7  D 


 


Albumin   3.4  D 


 


Urine Color   


 


Urine Appearance   


 


Urine pH   


 


Ur Specific Gravity   


 


Urine Protein   


 


Urine Glucose (UA)   


 


Urine Ketones   


 


Urine Blood   


 


Urine Nitrite   


 


Urine Bilirubin   


 


Urine Urobilinogen   


 


Ur Leukocyte Esterase   














  04/14/18 04/14/18 04/14/18





  12:32 12:32 12:32


 


WBC   


 


RBC   


 


Hgb   


 


Hct   


 


MCV   


 


MCH   


 


MCHC   


 


RDW   


 


Plt Count   


 


MPV   


 


Neutrophils %   


 


Lymphocytes %   


 


Monocytes %   


 


Eosinophils %   


 


Basophils %   


 


PT with INR   14.50 H 


 


INR   1.28 H 


 


PTT (Actin FS)   


 


Sodium   


 


Potassium   


 


Chloride   


 


Carbon Dioxide   


 


Anion Gap   


 


BUN   


 


Creatinine   


 


Creat Clearance w eGFR   


 


POC Glucometer   


 


Random Glucose   


 


Lactic Acid  1.3  


 


Calcium   


 


Magnesium   


 


Total Bilirubin   


 


AST   


 


ALT   


 


Alkaline Phosphatase   


 


Troponin I   


 


B-Natriuretic Peptide   


 


Total Protein   


 


Albumin   


 


Urine Color    Straw


 


Urine Appearance    Clear


 


Urine pH    7.0  D


 


Ur Specific Gravity    1.006


 


Urine Protein    Negative


 


Urine Glucose (UA)    Negative


 


Urine Ketones    Negative


 


Urine Blood    Negative


 


Urine Nitrite    Negative


 


Urine Bilirubin    Negative


 


Urine Urobilinogen    Negative


 


Ur Leukocyte Esterase    Negative














  04/14/18 04/15/18 04/15/18





  12:32 00:18 06:48


 


WBC    6.1


 


RBC    4.02


 


Hgb    12.1


 


Hct    35.1 L


 


MCV    87.4


 


MCH    30.2


 


MCHC    34.5


 


RDW    14.5


 


Plt Count    164


 


MPV    8.2


 


Neutrophils %    62.3


 


Lymphocytes %    24.7  D


 


Monocytes %    11.8 H


 


Eosinophils %    0.7  D


 


Basophils %    0.5


 


PT with INR   


 


INR   


 


PTT (Actin FS)   


 


Sodium   


 


Potassium   


 


Chloride   


 


Carbon Dioxide   


 


Anion Gap   


 


BUN   


 


Creatinine   


 


Creat Clearance w eGFR   


 


POC Glucometer   116 


 


Random Glucose   


 


Lactic Acid   


 


Calcium   


 


Magnesium   


 


Total Bilirubin   


 


AST   


 


ALT   


 


Alkaline Phosphatase   


 


Troponin I  < 0.02  


 


B-Natriuretic Peptide  877.54 H  


 


Total Protein   


 


Albumin   


 


Urine Color   


 


Urine Appearance   


 


Urine pH   


 


Ur Specific Gravity   


 


Urine Protein   


 


Urine Glucose (UA)   


 


Urine Ketones   


 


Urine Blood   


 


Urine Nitrite   


 


Urine Bilirubin   


 


Urine Urobilinogen   


 


Ur Leukocyte Esterase   














  04/15/18





  06:48


 


WBC 


 


RBC 


 


Hgb 


 


Hct 


 


MCV 


 


MCH 


 


MCHC 


 


RDW 


 


Plt Count 


 


MPV 


 


Neutrophils % 


 


Lymphocytes % 


 


Monocytes % 


 


Eosinophils % 


 


Basophils % 


 


PT with INR 


 


INR 


 


PTT (Actin FS) 


 


Sodium  141


 


Potassium  3.6


 


Chloride  106


 


Carbon Dioxide  31


 


Anion Gap  4 L


 


BUN  17


 


Creatinine  0.9


 


Creat Clearance w eGFR  > 60


 


POC Glucometer 


 


Random Glucose  110 H


 


Lactic Acid 


 


Calcium  8.1 L


 


Magnesium  2.2


 


Total Bilirubin  1.1 H D


 


AST  17


 


ALT  < 6 L


 


Alkaline Phosphatase  41 L D


 


Troponin I 


 


B-Natriuretic Peptide 


 


Total Protein  5.7 L


 


Albumin  2.7 L D


 


Urine Color 


 


Urine Appearance 


 


Urine pH 


 


Ur Specific Gravity 


 


Urine Protein 


 


Urine Glucose (UA) 


 


Urine Ketones 


 


Urine Blood 


 


Urine Nitrite 


 


Urine Bilirubin 


 


Urine Urobilinogen 


 


Ur Leukocyte Esterase 








                  Active Medications











Generic Name Dose Route Start Last Admin





  Trade Name Freq  PRN Reason Stop Dose Admin


 


Aspirin  81 mg  04/15/18 10:00  04/15/18 09:49





  Asa -  PO   81 mg





  DAILY MITCHELL   Administration


 


Atorvastatin Calcium  40 mg  04/15/18 22:00  





  Lipitor -  PO   





  HS MITCHELL   


 


Carbidopa/Levodopa  1 combo  04/14/18 22:00  04/15/18 06:13





  Sinemet *Cr* 50/200 -  PO   1 combo





  TID MITCHELL   Administration


 


Donepezil HCl  10 mg  04/15/18 22:00  





  Aricept -  PO   





  HS MITCHELL   


 


Heparin Sodium (Porcine)  5,000 unit  04/14/18 22:00  04/15/18 06:14





  Heparin -  SQ   5,000 unit





  TID MITCHELL   Administration


 


Insulin Aspart  1 vial  04/14/18 22:00  04/15/18 06:55





  Novolog Vial Sliding Scale -  SQ   Not Given





  [facility]   





  Protocol   


 


Metoprolol Succinate  25 mg  04/15/18 10:00  04/15/18 09:55





  Toprol Xl -  PO   Not Given





  DAILY MITCHELL   


 


Potassium Chloride  20 meq  04/15/18 10:00  04/15/18 09:49





  K-Dur -  PO   20 meq





  DAILY MITCHELL   Administration


 


Tamsulosin HCl  0.4 mg  04/15/18 08:30  04/15/18 09:49





  Flomax -  PO   0.4 mg





  DAILY@0830 MITCHELL   Administration


 


Torsemide  100 mg  04/15/18 10:00  04/15/18 09:54





  Demadex -  PO   Not Given





  DAILY MITCHELL   














ASSESSMENT/PLAN


82 year-old man with a PMH significant for HTN, CAD s/p CABG x 5v, NIDDM, and 

possible osteomyelitis of left foot. Placed on observation for bilateral lower 

extremity edema, pain, and inability to weight bear.





r/o CHF


   --bilateral lower extremity edema, BNP mildly elevated


   --lower extremity edema was noted during previous hospitalization in March 2018 but it does not appear there was a cardiac workup


   --echo ordered 


   --continue home dose torsemide 100mg for now


   --strict I&Os, daily weights


      


Hypertension


   --BP stable


   --continue Toprol XL, Torsemide





Coronary artery disease


   --continue Toprol XL, Lipitor, ASA





NIDDM


   --Novolog sliding scale coverage





Possible osteomyelitis left foot


   --presently off antibiotics


   --request Dr. Holley consult





FEN


   Fluids: PO intake adequate


   Electrolytes: replete as indicated


   Nutrition: low sodium diabetic





DVT prophylaxis: subq heparin





Physical therapy





Dispo: continues to require observation. Full code.





Visit type





- Emergency Visit


Emergency Visit: Yes


ED Registration Date: 04/14/18


Care time: The patient presented to the Emergency Department on the above date 

and was hospitalized for further evaluation of their emergent condition.





- New Patient


This patient is new to me today: No





- Critical Care


Critical Care patient: No

## 2018-04-15 NOTE — CONSULT
Consult


Consult Specialty:: Cardiology (Dr. Nunez for Dr. Echeverria)


Referred by:: Medicine


Reason for Consultation:: Lower extremity edema, ?CHF





- History of Present Illness


Chief Complaint: Foot pain (Bilateral)


History of Present Illness: 





82 year old male


Known to Dr. Lu (cardiology)


History of hypertension, CAD with prior CABG


NIDDM and Parkinson's Dementia


Now admitted with bilateral leg/foot pain and swelling x 2 months


Has known osteo of left foor


LE duplex ultrasound negative for DVT


Found to have elevated 


Denies any chest pain or palpitations, does endorse mild dyspnea with 2 blocks 

ambulation but this has been stable over the past 











- History Source


History Provided By: Patient, Medical Record





- Past Medical History


CNS: Yes: Parkinson's


Cardio/Vascular: Yes: CAD





- Alcohol/Substance Use


Hx Alcohol Use: No





- Smoking History


Smoking history: Never smoked


Have you smoked in the past 12 months: No





Home Medications





- Allergies


Allergies/Adverse Reactions: 


 Allergies











Allergy/AdvReac Type Severity Reaction Status Date / Time


 


No Known Allergies Allergy   Verified 04/14/18 11:21














- Home Medications


Home Medications: 


Ambulatory Orders





Metoprolol Succinate [Toprol XL -] 25 mg PO DAILY 04/15/15 


Potassium Chloride 20 meq PO DAILY 04/15/15 


Tamsulosin HCl 0.4 mg PO DAILY 04/15/15 


Aspirin 81 mg PO DAILY 03/01/18 


Atorvastatin Ca [Lipitor] 40 mg PO DAILY 03/01/18 


Carbidopa/Levodopa *Cr* 50/200 [Sinemet *Cr* 50/200 -] 1 tab PO TID 03/01/18 


Metformin HCl 500 mg PO TID 03/01/18 


Torsemide 100 mg PO DAILY 03/01/18 


Donepezil HCl [Aricept] 10 mg PO DAILY 03/02/18 


Amox-Tr/K Cl [Augmentin 875-125mg Tablet -] 1 tab PO BID@0800,1730 #14 tablet 03 /06/18 











Review of Systems





- Review of Systems


Constitutional: reports: No Symptoms


Eyes: reports: No Symptoms


HENT: reports: No Symptoms


Neck: reports: No Symptoms


Cardiovascular: reports: Shortness of Breath


Respiratory: reports: SOB


Gastrointestinal: reports: No Symptoms


Musculoskeletal: reports: Extremity Pain


Neurological: reports: No Symptoms





Physical Exam


Vital Signs: 


 Vital Signs











Temperature  98 F   04/15/18 09:00


 


Pulse Rate  79   04/15/18 09:00


 


Respiratory Rate  20   04/15/18 09:00


 


Blood Pressure  108/52   04/15/18 09:00


 


O2 Sat by Pulse Oximetry (%)  98   04/15/18 03:36











Constitutional: Yes: No Distress, Calm


Eyes: Yes: WNL, Conjunctiva Clear


HENT: Yes: WNL


Neck: Yes: WNL


Cardiovascular: Yes: Regular Rate and Rhythm


Respiratory: Yes: CTA Bilaterally


Gastrointestinal: Yes: Normal Bowel Sounds


Extremities: Yes: WNL


Edema: Yes


Edema: LLE: 1+, RLE: 1+


Labs: 


 CBC, BMP





 04/15/18 06:48 





 04/15/18 06:48 











Imaging





- Results


EKG: Image Reviewed (Done at 4/14/2018 at 19:19 NSR at 89/min with RBBB and 

lateral T-wave inversions)





Assessment/Plan





81 yo male with HTN, CAD s/p CABG, NIDDM osteo of left foot now with bilateral 

leg pain and elevated BNP





1) Elevated BNP


-Edema and CXR with pulmonary venous congestion 


-Would continue Torsemide 100mg daily and reassess (is on Torsemide 100mg at 

home ? if taking)


-Trop negative and no DVT on duplex


-Will get echo to assess LV/RV





2) CAD


-S/p CABG


-Stable without ischemic symptoms


-Echo for LV function





3) HTN


-Well controlled


-Continue outpatient antihypertensive regimen





4) DM


-Primary team recs

## 2018-04-15 NOTE — CONSULT
Consult - text type





- Consultation


Consultation Note: 





Podiatry Consultation:





82 year old DM M well known to me from prior admission and [facility], presents with 

bilateral LE weakness, pain, swelling.  Denies F/V/N/C/SOB/CP.  Afebrile, VSS. 

He is having trouble walking.  Sugars fairly controlled.





PMHx: DM, HTN, CAD s/p CABG


MEds: noted


ALL: NKMA





YOLANDE:





Pedal pulses 1/4, TG wnl, CFT brisk to all toes bilaterally.  There is a rigid 

2nd hammer toe contracture L worse than R.  There is no open wound, mild 

tenderness to palpation and ROM.  There is significant tenderness on ROM of 1st 

MTPJ.  There is significant tenderness on palpation of joint.  There is no 

erythema, no cellulitis, no active SOIs.  There is no fluctuance.  There is 

also tenderness on palpation of the lower leg.  





Blood Cx: no growth


WBC: 6.1





Foot XR: no evidence of osteomyelitis





Imp: 82 year old DM M with Bilateral foot pain


1. R foot pain ?gout:  uric acid


2. Physical Therapy


3. CHF workup


4. Encouraged leg elevation


5. Recommend PVRs to evaluate PVD


6. Thank you for the courtesy of this consultation.





ALICIA Holley DPM

## 2018-04-16 RX ADMIN — POTASSIUM CHLORIDE SCH MEQ: 1500 TABLET, EXTENDED RELEASE ORAL at 14:47

## 2018-04-16 RX ADMIN — INSULIN ASPART SCH: 100 INJECTION, SOLUTION INTRAVENOUS; SUBCUTANEOUS at 17:42

## 2018-04-16 RX ADMIN — HEPARIN SODIUM SCH UNIT: 5000 INJECTION, SOLUTION INTRAVENOUS; SUBCUTANEOUS at 21:26

## 2018-04-16 RX ADMIN — ASPIRIN 81 MG SCH MG: 81 TABLET ORAL at 14:47

## 2018-04-16 RX ADMIN — HEPARIN SODIUM SCH UNIT: 5000 INJECTION, SOLUTION INTRAVENOUS; SUBCUTANEOUS at 14:54

## 2018-04-16 RX ADMIN — DONEPEZIL HYDROCHLORIDE SCH MG: 10 TABLET, FILM COATED ORAL at 21:26

## 2018-04-16 RX ADMIN — TAMSULOSIN HYDROCHLORIDE SCH MG: 0.4 CAPSULE ORAL at 14:46

## 2018-04-16 RX ADMIN — INSULIN ASPART SCH: 100 INJECTION, SOLUTION INTRAVENOUS; SUBCUTANEOUS at 14:51

## 2018-04-16 RX ADMIN — INSULIN ASPART SCH: 100 INJECTION, SOLUTION INTRAVENOUS; SUBCUTANEOUS at 06:16

## 2018-04-16 RX ADMIN — PRAMIPEXOLE DIHYDROCHLORIDE SCH MG: 0.12 TABLET ORAL at 22:41

## 2018-04-16 RX ADMIN — TORSEMIDE SCH MG: 100 TABLET ORAL at 14:48

## 2018-04-16 RX ADMIN — HEPARIN SODIUM SCH UNIT: 5000 INJECTION, SOLUTION INTRAVENOUS; SUBCUTANEOUS at 05:47

## 2018-04-16 RX ADMIN — ATORVASTATIN CALCIUM SCH MG: 40 TABLET, FILM COATED ORAL at 21:26

## 2018-04-16 RX ADMIN — INSULIN ASPART SCH: 100 INJECTION, SOLUTION INTRAVENOUS; SUBCUTANEOUS at 21:31

## 2018-04-16 NOTE — CONSULT
Consult - text type





- Consultation


Consultation Note: 








NEUROLOGY CONSULTATION is greatly appreciated:





This 81 yo RH  man with h/o HTN, DM, Chol and SHD is s/p CABG x 5. Known 

to have Parkinson's disease and OMS.


Maintained on torsemide, metformin, metoprolol, tamsulosin, ASA, atorvastatin, 

Sinemet CR 50/200 TID and donepezil 10 mg.


Recently admitted with pain in the feet and possible osteomyelitis.


Now back with Burning pains in both feet making ambulation impossible. Also 

interrupting sleep.


X Rays neg for osteomyelitis, fracture, etc.





DAQUAN: Feet swollen and tender but NO erythema, wounds.


       No bruits.


NEURO: Awake, alert, remembers me.


           O x March 2018, [place]. +Glabella


           Speech fluent.


           CN II-XII: Normal except intermittent, rhythmic jaw tremor.


           Motor: Min rest tremor. + Fine, rapid, asynchronous mov'ts of the 

toes (L>R).


                     Normal strength. + Cogwheel rigidity. Decreased AJ's. Toes 

downgoing


          Coord: No FTN dystaxia


          Sensory: Normal vibration over the feet.


          Gait: refused by patient.





IMP: 1. Mild-Mod OMS without focality


       2. Parkinsonism, probably Parkinson's disease.


       3. Possible Restless Legs (RLS) associated with PD.





SUGGEST: R/O mechanical and infectious etiology


               Agree with uric acid, B12, ESR, CRP


               Continue Sinemet CR 50/200 but give TID @ 7, 12, and 5.


               Add pramipexole 0.125 mg PO TID with Sinemet x 3 days then .25 

mg PO TID


               OOB to chair, elevate, legs, PT for gait with walker.





Thank you very much,


Shashi Garcia MD

## 2018-04-16 NOTE — PN
Physical Exam: 


SUBJECTIVE: Patient seen and examined








OBJECTIVE:





 Vital Signs











 Period  Temp  Pulse  Resp  BP Sys/Lowe  Pulse Ox


 


 Last 24 Hr  97.7 F-100.1 F  85-91  16-20  120-133/57-85  96-98











GENERAL: The patient is awake, alert, and fully oriented, in no acute distress.


LUNGS: Breath sounds equal, clear to auscultation bilaterally, no wheezes, no 

crackles, no accessory muscle use. 


HEART: Regular rate and rhythm, S1, S2 


ABDOMEN: Soft, nontender, nondistended, normoactive bowel sounds, no guarding, 

no rebound


EXTREMITIES: 3+bilateral lower extremity edema; tenderness both ankles and feet

  


NEUROLOGICAL: Cranial nerves II through XII grossly intact. Normal speech, gait 

not observed.











 CBCD











WBC  6.1 K/mm3 (4.0-10.0)   04/15/18  06:48    


 


RBC  4.02 M/mm3 (4.00-5.60)   04/15/18  06:48    


 


Hgb  12.1 GM/dL (11.7-16.9)   04/15/18  06:48    


 


Hct  35.1 % (35.4-49)  L  04/15/18  06:48    


 


MCV  87.4 fl (80-96)   04/15/18  06:48    


 


MCHC  34.5 g/dl (32.0-35.9)   04/15/18  06:48    


 


RDW  14.5 % (11.9-15.9)   04/15/18  06:48    


 


Plt Count  164 K/MM3 (134-434)   04/15/18  06:48    


 


MPV  8.2 fl (7.5-11.1)   04/15/18  06:48    








 CMP











Sodium  141 mmol/L (136-145)   04/15/18  06:48    


 


Potassium  3.6 mmol/L (3.5-5.1)   04/15/18  06:48    


 


Chloride  106 mmol/L ()   04/15/18  06:48    


 


Carbon Dioxide  31 mmol/L (21-32)   04/15/18  06:48    


 


Anion Gap  4  (8-16)  L  04/15/18  06:48    


 


BUN  17 mg/dL (7-18)   04/15/18  06:48    


 


Creatinine  0.9 mg/dL (0.7-1.3)   04/15/18  06:48    


 


Creat Clearance w eGFR  > 60  (>60)   04/15/18  06:48    


 


Calcium  8.1 mg/dL (8.5-10.1)  L  04/15/18  06:48    


 


Total Bilirubin  1.1 mg/dL (0.2-1.0)  H D 04/15/18  06:48    


 


AST  17 U/L (15-37)   04/15/18  06:48    


 


ALT  < 6 U/L (12-78)  L  04/15/18  06:48    


 


Alkaline Phosphatase  41 U/L ()  L D 04/15/18  06:48    


 


Total Protein  5.7 g/dl (6.4-8.2)  L  04/15/18  06:48    


 


Albumin  2.7 g/dl (3.4-5.0)  L D 04/15/18  06:48    











                  Active Medications











Generic Name Dose Route Start Last Admin





  Trade Name Freq  PRN Reason Stop Dose Admin


 


Aspirin  81 mg  04/15/18 10:00  04/16/18 14:47





  Asa -  PO   81 mg





  DAILY MITCHELL   Administration


 


Atorvastatin Calcium  40 mg  04/15/18 22:00  04/15/18 22:30





  Lipitor -  PO   40 mg





  HS MITCHELL   Administration


 


Carbidopa/Levodopa  1 combo  04/14/18 22:00  04/16/18 14:47





  Sinemet *Cr* 50/200 -  PO   1 combo





  TID MITCHELL   Administration


 


Donepezil HCl  10 mg  04/15/18 22:00  04/15/18 22:31





  Aricept -  PO   10 mg





  HS MITCHELL   Administration


 


Heparin Sodium (Porcine)  5,000 unit  04/14/18 22:00  04/16/18 14:54





  Heparin -  SQ   5,000 unit





  TID MITCHELL   Administration


 


Insulin Aspart  1 vial  04/14/18 22:00  04/16/18 14:51





  Novolog Vial Sliding Scale -  SQ   Not Given





  [facility]   





  Protocol   


 


Metoprolol Succinate  25 mg  04/15/18 10:00  04/16/18 14:47





  Toprol Xl -  PO   25 mg





  DAILY MITCHELL   Administration


 


Potassium Chloride  20 meq  04/15/18 10:00  04/16/18 14:47





  K-Dur -  PO   20 meq





  DAILY MITCHELL   Administration


 


Tamsulosin HCl  0.4 mg  04/15/18 08:30  04/16/18 14:46





  Flomax -  PO   0.4 mg





  DAILY@0830 MITCHELL   Administration


 


Torsemide  100 mg  04/15/18 10:00  04/16/18 14:48





  Demadex -  PO   100 mg





  DAILY MITCHELL   Administration











ASSESSMENT/PLAN


82 year-old man with a PMH significant for HTN, CAD s/p CABG x 5v, NIDDM, and 

possible osteomyelitis of left foot. Placed on observation for bilateral lower 

extremity edema, pain, and inability to weight bear.





r/o CHF


Elevated BNP


   --bilateral lower extremity edema, BNP mildly elevated


   --lower extremity edema was noted during previous hospitalization in March 2018 but it does not appear there was a cardiac workup


   --4/16: LV mild to moderately reduced, EF 40-45%, mild to moderate global 

hypkinesis; RV normal; mild MR; mild AI; mild PI 


   --continue home dose torsemide 100mg for now


   --strict I&Os, daily weights - first weight was taken today 107.6kg 


      


Hypertension


   --BP stable


   --continue Toprol XL, Torsemide





Coronary artery disease


   --continue Toprol XL, Lipitor, ASA





NIDDM


   --Novolog sliding scale coverage





Possible osteomyelitis left foot


Bilateral foot pain


   --presently off antibiotics


   --arterial dopplers ordered by Dr. Holley





Gait difficulty


   --patient's wife, nursing staff, PT staff, and ED resident all report 

patient is unable to take a step; it is unclear if this is totally attributable 

to pain


   --on carbidopa/levodopa


   --neuro consult for Dr. Garcia who has seen patient as outpatient





FEN


   Fluids: PO intake adequate


   Electrolytes: replete as indicated


   Nutrition: low sodium diabetic





DVT prophylaxis: subq heparin





Physical therapy





Dispo: continues to require observation. Full code.








Visit type





- Emergency Visit


Emergency Visit: Yes


ED Registration Date: 04/15/18


Care time: The patient presented to the Emergency Department on the above date 

and was hospitalized for further evaluation of their emergent condition.





- New Patient


This patient is new to me today: No





- Critical Care


Critical Care patient: No

## 2018-04-16 NOTE — EKG
Test Reason : 

Blood Pressure : ***/*** mmHG

Vent. Rate : 089 BPM     Atrial Rate : 089 BPM

   P-R Int : 164 ms          QRS Dur : 142 ms

    QT Int : 448 ms       P-R-T Axes : 036 -14 -02 degrees

   QTc Int : 545 ms

 

NORMAL SINUS RHYTHM

RIGHT BUNDLE BRANCH BLOCK

POSSIBLE INFERIOR INFARCT (CITED ON OR BEFORE 27-MAY-2010)

T WAVE ABNORMALITY, CONSIDER LATERAL ISCHEMIA

ABNORMAL ECG

WHEN COMPARED WITH ECG OF 14-APR-2018 11:28,

PREMATURE VENTRICULAR COMPLEXES ARE NO LONGER PRESENT

Confirmed by JACEK DUNLAP MD ([id number]) on 4/16/2018 12:29:48 PM

 

Referred By:             Confirmed By:JACEK DUNLAP MD

## 2018-04-16 NOTE — EKG
Test Reason : 

Blood Pressure : ***/*** mmHG

Vent. Rate : 094 BPM     Atrial Rate : 094 BPM

   P-R Int : 176 ms          QRS Dur : 146 ms

    QT Int : 446 ms       P-R-T Axes : 006 -09 -19 degrees

   QTc Int : 557 ms

 

SINUS RHYTHM WITH FREQUENT PREMATURE VENTRICULAR COMPLEXES

RIGHT ATRIAL ENLARGEMENT

RIGHT BUNDLE BRANCH BLOCK

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

T WAVE ABNORMALITY, CONSIDER LATERAL ISCHEMIA

ABNORMAL ECG

WHEN COMPARED WITH ECG OF 01-MAR-2018 19:42,

NO SIGNIFICANT CHANGE WAS FOUND

Confirmed by JACEK DUNLAP MD ([id number]) on 4/16/2018 12:49:19 PM

 

Referred By:             Confirmed By:JACEK DUNLAP MD

## 2018-04-16 NOTE — PN
Progress Note (short form)





- Note


Progress Note: 











CC: BLE pain





S:  sx's improving.  no cp, palps, sob, dizziness, le edema.   


 





 Current Medications





Aspirin (Asa -)  81 mg PO DAILY [facility]


   Last Admin: 04/16/18 14:47 Dose:  81 mg


Atorvastatin Calcium (Lipitor -)  40 mg PO HS [facility]


   Last Admin: 04/15/18 22:30 Dose:  40 mg


Carbidopa/Levodopa (Sinemet *Cr* 50/200 -)  1 combo PO TID [facility]


   Last Admin: 04/16/18 14:47 Dose:  1 combo


Donepezil HCl (Aricept -)  10 mg PO HS [facility]


   Last Admin: 04/15/18 22:31 Dose:  10 mg


Heparin Sodium (Porcine) (Heparin -)  5,000 unit SQ TID [facility]


   Last Admin: 04/16/18 14:54 Dose:  5,000 unit


Insulin Aspart (Novolog Vial Sliding Scale -)  1 vial SQ ACHS [facility]


   PRN Reason: Protocol


   Last Admin: 04/16/18 14:51 Dose:  Not Given


Metoprolol Succinate (Toprol Xl -)  25 mg PO DAILY [facility]


   Last Admin: 04/16/18 14:47 Dose:  25 mg


Potassium Chloride (K-Dur -)  20 meq PO DAILY [facility]


   Last Admin: 04/16/18 14:47 Dose:  20 meq


Tamsulosin HCl (Flomax -)  0.4 mg PO DAILY@0830 [facility]


   Last Admin: 04/16/18 14:46 Dose:  0.4 mg


Torsemide (Demadex -)  100 mg PO DAILY [facility]


   Last Admin: 04/16/18 14:48 Dose:  100 mg








Physical Exam


Vital Signs: 


 





 Vital Signs - 24 hr











  04/15/18 04/15/18 04/15/18





  18:00 19:00 22:00


 


Temperature 100.1 F H  99.2 F


 


Pulse Rate 90  85


 


Respiratory 20 20 20





Rate   


 


Blood Pressure 120/57  127/66


 


O2 Sat by Pulse  98 





Oximetry (%)   














  04/16/18 04/16/18 04/16/18





  02:00 03:00 06:00


 


Temperature 99.0 F  98.5 F


 


Pulse Rate 90  88


 


Respiratory 20 20 20





Rate   


 


Blood Pressure 132/73  132/70


 


O2 Sat by Pulse  98 





Oximetry (%)   














  04/16/18 04/16/18 04/16/18





  10:00 11:00 14:33


 


Temperature 99.1 F  97.7 F


 


Pulse Rate 88  91 H


 


Respiratory 16  18





Rate   


 


Blood Pressure 128/70  133/85


 


O2 Sat by Pulse  96 





Oximetry (%)   














 Intake & Output











 04/14/18 04/15/18 04/16/18 04/17/18





 07:59 07:59 07:59 07:59


 


Intake Total  120 1530 550


 


Output Total  500 1900 500


 


Balance  -380 -370 50


 


Weight  165 lb  237 lb 6 oz














Constitutional: Yes: No Distress, Calm


Eyes: Yes: WNL, Conjunctiva Clear


HENT: Yes: WNL


Neck: Yes: WNL


Cardiovascular: Yes: Regular Rate and Rhythm


Respiratory: Yes: CTA Bilaterally


Gastrointestinal: Yes: Normal Bowel Sounds


Extremities: Yes: WNL


Edema: Yes


Edema: LLE: 1+, RLE: 1+


Labs: 


 





 CBC, BMP





 04/15/18 06:48 





 04/15/18 06:48 











Imaging





- Results


EKG: Image Reviewed (Done at 4/14/2018 at 19:19 NSR at 89/min with RBBB and 

lateral T-wave inversions)








Echo 12/2017 focused echo with contrast for wall motion: EF 45-50%.  global HK 

with additional HK of basal inferior wall.  can't exclude additional HK of mid-

to-distal lat wall and anterior apex.  paradoxical/dyssynergistic septal motion 

c/w post-op status.   


  


cath msh2/2018: LVEDP/wedge 10.  RA 5, RV 25/5, PA 25-15 mpap 18.   CO 3.9, PVR 

2.1 


dLM 50-60%, pLAD mod disease, fills via LIMA, D1 (small) severe diffuse 

disease.  pLCx focal 70-80%, OM1 mod dz, fills via SVG. LPL1, mild dz. pRamus 90

-95%, fills via SVG.  pRCA 30-50%, mRCA 70-80%, AV continuation 90-95%.  Patent 

lima to lad, [id number].  Total occlusion of svg-rca/rpda.  EF 50% with mild 

hk of diaphragmatic and anterolateral wall. 





Assessment/Plan





83 yo male with HTN, CAD s/p CABG, NIDDM, osteo of left foot and Parkinson's 

Dementia who p/w bilateral leg pain and elevated BNP





 





1) Elevated BNP/known ischemic cardiomyopathy


-Edema and CXR with pulmonary venous congestion 


-Started on  Torsemide 100mg daily here, con't.   Patient outpatient torsemide 

dose had recently  been decreased to 60 mg/day.  May need to uptitrate home 

regimen to 80 mg vs. 100 mg daily on d/c.  Reassess bmp tomorrow afer getting 

[id number] mg daily x 2.  


-Trop negative and no DVT on duplex


-echo pending





2) CAD


-S/p CABG.  recent cath in february with residual disease (medically managed).


-Stable without ischemic symptoms


 





3) HTN


-Well controlled


-Continue outpatient antihypertensive regimen





4) DM


-Primary team recs





5) LE pain 


- - MRI march couldn't exclude osteo of left toe.  s/p abx course at that time. 

LE duplex ultrasound negative for DVT.  surgery following.

## 2018-04-17 LAB
ALBUMIN SERPL-MCNC: 2.8 G/DL (ref 3.4–5)
ALP SERPL-CCNC: 46 U/L (ref 45–117)
ALT SERPL-CCNC: < 6 U/L (ref 12–78)
ANION GAP SERPL CALC-SCNC: 10 MMOL/L (ref 8–16)
AST SERPL-CCNC: 29 U/L (ref 15–37)
BILIRUB SERPL-MCNC: 1 MG/DL (ref 0.2–1)
BUN SERPL-MCNC: 18 MG/DL (ref 7–18)
CALCIUM SERPL-MCNC: 8.2 MG/DL (ref 8.5–10.1)
CHLORIDE SERPL-SCNC: 102 MMOL/L (ref 98–107)
CO2 SERPL-SCNC: 26 MMOL/L (ref 21–32)
CREAT SERPL-MCNC: 1.1 MG/DL (ref 0.7–1.3)
GLUCOSE SERPL-MCNC: 118 MG/DL (ref 74–106)
MAGNESIUM SERPL-MCNC: 2.5 MG/DL (ref 1.8–2.4)
POTASSIUM SERPLBLD-SCNC: 3.6 MMOL/L (ref 3.5–5.1)
PROT SERPL-MCNC: 6.2 G/DL (ref 6.4–8.2)
SODIUM SERPL-SCNC: 138 MMOL/L (ref 136–145)
URATE SERPL-SCNC: 8 MG/DL (ref 2.6–7.2)

## 2018-04-17 RX ADMIN — ASPIRIN 81 MG SCH MG: 81 TABLET ORAL at 08:59

## 2018-04-17 RX ADMIN — TORSEMIDE SCH MG: 100 TABLET ORAL at 09:00

## 2018-04-17 RX ADMIN — HEPARIN SODIUM SCH UNIT: 5000 INJECTION, SOLUTION INTRAVENOUS; SUBCUTANEOUS at 06:16

## 2018-04-17 RX ADMIN — INSULIN ASPART SCH: 100 INJECTION, SOLUTION INTRAVENOUS; SUBCUTANEOUS at 16:25

## 2018-04-17 RX ADMIN — POTASSIUM CHLORIDE SCH MEQ: 1500 TABLET, EXTENDED RELEASE ORAL at 08:59

## 2018-04-17 RX ADMIN — PRAMIPEXOLE DIHYDROCHLORIDE SCH MG: 0.12 TABLET ORAL at 16:46

## 2018-04-17 RX ADMIN — TAMSULOSIN HYDROCHLORIDE SCH MG: 0.4 CAPSULE ORAL at 08:59

## 2018-04-17 RX ADMIN — HEPARIN SODIUM SCH UNIT: 5000 INJECTION, SOLUTION INTRAVENOUS; SUBCUTANEOUS at 21:13

## 2018-04-17 RX ADMIN — PRAMIPEXOLE DIHYDROCHLORIDE SCH MG: 0.12 TABLET ORAL at 11:55

## 2018-04-17 RX ADMIN — ATORVASTATIN CALCIUM SCH MG: 40 TABLET, FILM COATED ORAL at 21:13

## 2018-04-17 RX ADMIN — INSULIN ASPART SCH: 100 INJECTION, SOLUTION INTRAVENOUS; SUBCUTANEOUS at 11:28

## 2018-04-17 RX ADMIN — DONEPEZIL HYDROCHLORIDE SCH MG: 10 TABLET, FILM COATED ORAL at 21:13

## 2018-04-17 RX ADMIN — PRAMIPEXOLE DIHYDROCHLORIDE SCH MG: 0.12 TABLET ORAL at 06:16

## 2018-04-17 RX ADMIN — INSULIN ASPART SCH: 100 INJECTION, SOLUTION INTRAVENOUS; SUBCUTANEOUS at 06:16

## 2018-04-17 RX ADMIN — HEPARIN SODIUM SCH UNIT: 5000 INJECTION, SOLUTION INTRAVENOUS; SUBCUTANEOUS at 13:03

## 2018-04-17 NOTE — PN
Progress Note (short form)





- Note


Progress Note: 





Podiatry F/U:





Seen/evaluated at bedside with patient's wife, denies F/V/N/C/SOB/CP.  Afebrile

, VSS.  Per patient's wife, patient is still having trouble walking.  He is 

able to stand, however cannot take the steps to walk.  THe patient was 

scheduled for arterial studies in the wound healing center in the winter, 

however the patient's wife was unable to get him to the appointment.  His 

dementia and parkinson's symptoms are mostly unchanged, as per the wife.





YOLANDE:





Pedal pulses 1/4, TG wnl, CFT brisk to all toes.  Moderate tenderness to 

palpation of midfoot, ankle, 2nd digit bilateral foot.  There are no open wounds

, no erythema, no cellulitis, no signs of active infection.  Moderate 

tenderness on range of motion.   There is negative anterior drawer of the ankle

, stress inversion is negative with guarding and restricted motion.





Uric Acid: 8.0





Imp: 82 year old DM M with bilateral foot pain, weakness, gait instability


1. Uric acid is elevated, can trial with colchicine tx and see if that improves 

symptoms.


2. Will arrange for outpatient f/u for arterial studies.


3. Needs close follow up with Neurology.


4. Agree with rehab placement for gait training.


5. Will f/u with me in wound healing center in 1-2 weeks.





ALICIA Holley DPM

## 2018-04-17 NOTE — PN
Physical Exam: 


SUBJECTIVE: Patient seen and examined








OBJECTIVE:





 Vital Signs











 Period  Temp  Pulse  Resp  BP Sys/Lowe  Pulse Ox


 


 Last 24 Hr  97.7 F-99.0 F    18-18  103-137/43-85  98-98











GENERAL: The patient is awake, alert, and fully oriented, in no acute distress.


LUNGS: Breath sounds equal, clear to auscultation bilaterally, no wheezes, no 

crackles, no accessory muscle use. 


HEART: Regular rate and rhythm, S1, S2 


ABDOMEN: Soft, nontender, nondistended, normoactive bowel sounds, no guarding, 

no rebound


EXTREMITIES: 2-3+bilateral lower extremity edema; tenderness both ankles and 

feet  


NEUROLOGICAL: Cranial nerves II through XII grossly intact. Normal speech, gait 

not observed.














 Laboratory Results - last 24 hr











  04/15/18 04/15/18 04/16/18





  06:53 06:58 16:36


 


ESR   


 


Sodium   


 


Potassium   


 


Chloride   


 


Carbon Dioxide   


 


Anion Gap   


 


BUN   


 


Creatinine   


 


Creat Clearance w eGFR   


 


POC Glucometer  107  125  116


 


Random Glucose   


 


Uric Acid   


 


Calcium   


 


Magnesium   


 


Total Bilirubin   


 


AST   


 


ALT   


 


Alkaline Phosphatase   


 


C-Reactive Protein   


 


Total Protein   


 


Albumin   


 


Vitamin B12   














  04/16/18 04/17/18 04/17/18





  21:29 06:00 06:00


 


ESR   


 


Sodium   138 


 


Potassium   3.6 


 


Chloride   102 


 


Carbon Dioxide   26 


 


Anion Gap   10 


 


BUN   18 


 


Creatinine   1.1  D 


 


Creat Clearance w eGFR   > 60 


 


POC Glucometer  119  


 


Random Glucose   118 H 


 


Uric Acid   8.0 H D 


 


Calcium   8.2 L 


 


Magnesium   2.5 H 


 


Total Bilirubin   1.0 


 


AST   29  D 


 


ALT   < 6 L 


 


Alkaline Phosphatase   46 


 


C-Reactive Protein   19.0 H  Cancelled


 


Total Protein   6.2 L 


 


Albumin   2.8 L 


 


Vitamin B12   433  Cancelled














  04/17/18 04/17/18 04/17/18





  06:00 06:11 11:15


 


ESR  85 H  


 


Sodium   


 


Potassium   


 


Chloride   


 


Carbon Dioxide   


 


Anion Gap   


 


BUN   


 


Creatinine   


 


Creat Clearance w eGFR   


 


POC Glucometer   123  138


 


Random Glucose   


 


Uric Acid   


 


Calcium   


 


Magnesium   


 


Total Bilirubin   


 


AST   


 


ALT   


 


Alkaline Phosphatase   


 


C-Reactive Protein   


 


Total Protein   


 


Albumin   


 


Vitamin B12   








                  Active Medications











Generic Name Dose Route Start Last Admin





  Trade Name Freq  PRN Reason Stop Dose Admin


 


Aspirin  81 mg  04/15/18 10:00  04/17/18 08:59





  Asa -  PO   81 mg





  DAILY MITCHELL   Administration


 


Atorvastatin Calcium  40 mg  04/15/18 22:00  04/16/18 21:26





  Lipitor -  PO   40 mg





  HS MITCHELL   Administration


 


Carbidopa/Levodopa  1 combo  04/17/18 12:00  04/17/18 11:56





  Sinemet *Cr* 50/200 -  PO  04/20/18 12:01  1 combo





  0700,1200,1700 MITCHELL   Administration


 


Colchicine  1.2 mg  04/17/18 12:38  





  Colcrys -  PO  04/17/18 12:39  





  ONCE ONE   


 


Donepezil HCl  10 mg  04/15/18 22:00  04/16/18 21:26





  Aricept -  PO   10 mg





  HS MITCHELL   Administration


 


Heparin Sodium (Porcine)  5,000 unit  04/14/18 22:00  04/17/18 06:16





  Heparin -  SQ   5,000 unit





  TID MITCHELL   Administration


 


Insulin Aspart  1 vial  04/14/18 22:00  04/17/18 11:28





  Novolog Vial Sliding Scale -  SQ   Not Given





  [facility]   





  Protocol   


 


Metoprolol Succinate  25 mg  04/15/18 10:00  04/17/18 08:59





  Toprol Xl -  PO   25 mg





  DAILY MITCHELL   Administration


 


Potassium Chloride  20 meq  04/15/18 10:00  04/17/18 08:59





  K-Dur -  PO   20 meq





  DAILY MITCHELL   Administration


 


Pramipexole Dihydrochloride  0.125 mg  04/17/18 12:00  04/17/18 11:55





  Mirapex -  PO  04/20/18 12:01  0.125 mg





  0700,1200,1700 MITCHELL   Administration


 


Pramipexole Dihydrochloride  0.25 mg  04/20/18 17:00  





  Mirapex -  PO   





  0700,1200,1700 MITCHELL   


 


Tamsulosin HCl  0.4 mg  04/15/18 08:30  04/17/18 08:59





  Flomax -  PO   0.4 mg





  DAILY@0830 MITCHELL   Administration


 


Torsemide  100 mg  04/15/18 10:00  04/17/18 09:00





  Demadex -  PO   100 mg





  DAILY MITCHELL   Administration











ASSESSMENT/PLAN


82 year-old man with a PMH significant for HTN, CAD s/p CABG x 5v, NIDDM, and 

possible osteomyelitis of left foot. Placed on observation for bilateral lower 

extremity edema, pain, and inability to weight bear.





Systolic heart failure, newly diagnosed 


   --4/16: LV mild to moderately reduced, EF 40-45%, mild to moderate global 

hypkinesis; RV normal; mild MR; mild AI; mild PI 


   --bilateral lower extremity edema, BNP mildly elevated; CXR clear


   --today is second dose of torsemide 100mg, reassess dose tomorrow


   --strict I&Os, daily weights - first weight was taken today 107.6kg (use 

same lift scale every day)


      


Hypertension


   --BP stable


   --continue Toprol XL, Torsemide





Coronary artery disease


   --continue Toprol XL, Lipitor, ASA





NIDDM


   --Novolog sliding scale coverage





Possible osteomyelitis left foot


Bilateral foot pain


   --presently off antibiotics


   --arterial dopplers ordered by Dr. Holley, can be done as outpatient





Organic brain syndrome


Parkinsonism possibly secondary to Parkinson's disease


Possible Restless Leg Syndrome 


   --seen and evaluated by Dr. Garcia


   --continue carbidopa/levodopa; add pramipexole 0.125mg TID for 3 days, then 

0.25mg TID thereafter


   --all dosing to be done at 7, 12, 5





Gout flare


   --Colchicine 1.2mg and 0.6mg given


   --continue to monitor





Gait Instability


   --patient's wife, nursing staff, PT staff, and ED resident all report 

patient is unable to take a step


   --likely multifactorial: Parkinsonism +/- organic brain syndrome +/- 

bilateral foot pain/osteo +/- lower extremity edema from CHF +/- gout flare


   --needs rehab placement





FEN


   Fluids: PO intake adequate


   Electrolytes: replete as indicated


   Nutrition: low sodium diabetic





DVT prophylaxis: subq heparin





Physical therapy





Dispo: continues to require inpatient care. SW working on SNF placement, wife 

agrees. Full code.











Visit type





- Emergency Visit


Emergency Visit: Yes


ED Registration Date: 04/15/18


Care time: The patient presented to the Emergency Department on the above date 

and was hospitalized for further evaluation of their emergent condition.





- New Patient


This patient is new to me today: No





- Critical Care


Critical Care patient: No

## 2018-04-17 NOTE — PN
Progress Note (short form)





- Note


Progress Note: 








CC: BLE pain





S:  LE pain improving.  no cp, palps, sob, dizziness, le edema.   


 





 


 Current Medications





Aspirin (Asa -)  81 mg PO DAILY [facility]


   Last Admin: 04/17/18 08:59 Dose:  81 mg


Atorvastatin Calcium (Lipitor -)  40 mg PO HS [facility]


   Last Admin: 04/16/18 21:26 Dose:  40 mg


Carbidopa/Levodopa (Sinemet *Cr* 50/200 -)  1 combo PO 0700,1200,1700 [facility]


   Stop: 04/20/18 12:01


   Last Admin: 04/17/18 11:56 Dose:  1 combo


Donepezil HCl (Aricept -)  10 mg PO HS [facility]


   Last Admin: 04/16/18 21:26 Dose:  10 mg


Heparin Sodium (Porcine) (Heparin -)  5,000 unit SQ TID [facility]


   Last Admin: 04/17/18 13:03 Dose:  5,000 unit


Insulin Aspart (Novolog Vial Sliding Scale -)  1 vial SQ ACHS [facility]


   PRN Reason: Protocol


   Last Admin: 04/17/18 11:28 Dose:  Not Given


Metoprolol Succinate (Toprol Xl -)  25 mg PO DAILY [facility]


   Last Admin: 04/17/18 08:59 Dose:  25 mg


Potassium Chloride (K-Dur -)  20 meq PO DAILY [facility]


   Last Admin: 04/17/18 08:59 Dose:  20 meq


Potassium Chloride (K-Dur -)  40 meq PO ONCE ONE


   Stop: 04/17/18 14:01


Pramipexole Dihydrochloride (Mirapex -)  0.125 mg PO 0700,1200,1700 [facility]


   Stop: 04/20/18 12:01


   Last Admin: 04/17/18 11:55 Dose:  0.125 mg


Pramipexole Dihydrochloride (Mirapex -)  0.25 mg PO 0700,1200,1700 [facility]


Tamsulosin HCl (Flomax -)  0.4 mg PO DAILY@0830 [facility]


   Last Admin: 04/17/18 08:59 Dose:  0.4 mg


Torsemide (Demadex -)  100 mg PO DAILY [facility]


   Last Admin: 04/17/18 09:00 Dose:  100 mg














Physical Exam


Vital Signs: 


 





 





 Vital Signs - 24 hr











  04/16/18 04/16/18 04/16/18





  14:33 19:00 23:00


 


Temperature 97.7 F 98.8 F 98.3 F


 


Pulse Rate 91 H 96 H 104 H


 


Respiratory 18 18 18





Rate   


 


Blood Pressure 133/85 113/61 120/68


 


O2 Sat by Pulse  98 





Oximetry (%)   














  04/17/18 04/17/18 04/17/18





  02:49 03:00 07:00


 


Temperature  99.0 F 97.9 F


 


Pulse Rate  91 H 89


 


Respiratory 18 18 18





Rate   


 


Blood Pressure  103/43 109/57


 


O2 Sat by Pulse 98  





Oximetry (%)   














  04/17/18





  09:50


 


Temperature 97.9 F


 


Pulse Rate 70


 


Respiratory 18





Rate 


 


Blood Pressure 137/72


 


O2 Sat by Pulse 





Oximetry (%) 














 Intake & Output











 04/15/18 04/16/18 04/17/18 04/18/18





 07:59 07:59 07:59 07:59


 


Intake Total 120 1530 850 


 


Output Total 500 1900 2400 


 


Balance -380 -370 -1550 


 


Weight 165 lb  237 lb 6 oz 














Constitutional: Yes: No Distress, Calm


Eyes: Yes: WNL, Conjunctiva Clear


HENT: Yes: WNL


Neck: Yes: WNL


Cardiovascular: Yes: Regular Rate and Rhythm, nl s1, s2.  no mrg.  JVD flat. 


Respiratory: Yes: CTA Bilaterally


Gastrointestinal: Yes: Normal Bowel Sounds


Extremities: Yes: WNL


Edema: No 


Labs: 


 





 


 CBC, BMP





 04/15/18 06:48 





 04/17/18 06:00 





 Laboratory Tests











  04/17/18





  06:00


 


Magnesium  2.5 H


 


Albumin  2.8 L

















Imaging





- Results


EKG: Image Reviewed (Done at 4/14/2018 at 19:19 NSR at 89/min with RBBB and 

lateral T-wave inversions)





echo 4/2018:  mild-mod dec LV fn.  EF 40-45%.  (global).  nl rv size/fn.  1+ ar

, 1+ mac/mr. 


Assessment/Plan





83 yo male with HTN, ischemic cardiomyopathy, CAD s/p CABG, NIDDM, osteo of 

left foot and Parkinson's Dementia who p/w bilateral leg pain and elevated BNP





 





1) Elevated BNP/known ischemic cardiomyopathy


-Edema and CXR with pulmonary venous congestion 


-Would continue Torsemide 100mg daily and reassess.  Patient torsemide dose had 

been decreased to 60 mg/day recently as outpatient (previously on 100 mg twice 

a week and 50 mg the other days).  Con't to reassess volume status on torsemide 

100 mg/day.  currently remains euvolemic.  If begins to look dry can decrease 

to 80 mg daily. 


- daily bmp, weights, i/o's. lyte repletion prn. 


-Trop negative and no DVT on duplex


-echo as above. 





2) CAD


-S/p CABG.  Recent cath with residual disease (medically managed).  


-Stable without ischemic symptoms


-con't asa, statin





3) HTN


-Well controlled


-Continue outpatient antihypertensive regimen





4) DM


-Primary team recs





5) LE pain 


- Per report, previous hx of  osteo of left rinku.  LE duplex ultrasound negative 

for DVT.  surgery following.

## 2018-04-18 LAB
ANION GAP SERPL CALC-SCNC: 7 MMOL/L (ref 8–16)
BUN SERPL-MCNC: 19 MG/DL (ref 7–18)
CALCIUM SERPL-MCNC: 8.1 MG/DL (ref 8.5–10.1)
CHLORIDE SERPL-SCNC: 102 MMOL/L (ref 98–107)
CO2 SERPL-SCNC: 31 MMOL/L (ref 21–32)
CREAT SERPL-MCNC: 0.9 MG/DL (ref 0.7–1.3)
GLUCOSE SERPL-MCNC: 108 MG/DL (ref 74–106)
POTASSIUM SERPLBLD-SCNC: 3.7 MMOL/L (ref 3.5–5.1)
SODIUM SERPL-SCNC: 140 MMOL/L (ref 136–145)

## 2018-04-18 RX ADMIN — DONEPEZIL HYDROCHLORIDE SCH MG: 10 TABLET, FILM COATED ORAL at 21:26

## 2018-04-18 RX ADMIN — PRAMIPEXOLE DIHYDROCHLORIDE SCH MG: 0.25 TABLET ORAL at 17:03

## 2018-04-18 RX ADMIN — INSULIN ASPART SCH: 100 INJECTION, SOLUTION INTRAVENOUS; SUBCUTANEOUS at 00:06

## 2018-04-18 RX ADMIN — TAMSULOSIN HYDROCHLORIDE SCH MG: 0.4 CAPSULE ORAL at 08:10

## 2018-04-18 RX ADMIN — INSULIN ASPART SCH: 100 INJECTION, SOLUTION INTRAVENOUS; SUBCUTANEOUS at 11:26

## 2018-04-18 RX ADMIN — INSULIN ASPART SCH: 100 INJECTION, SOLUTION INTRAVENOUS; SUBCUTANEOUS at 21:26

## 2018-04-18 RX ADMIN — TORSEMIDE SCH MG: 100 TABLET ORAL at 09:19

## 2018-04-18 RX ADMIN — INSULIN ASPART SCH: 100 INJECTION, SOLUTION INTRAVENOUS; SUBCUTANEOUS at 06:44

## 2018-04-18 RX ADMIN — PRAMIPEXOLE DIHYDROCHLORIDE SCH MG: 0.12 TABLET ORAL at 11:27

## 2018-04-18 RX ADMIN — HEPARIN SODIUM SCH UNIT: 5000 INJECTION, SOLUTION INTRAVENOUS; SUBCUTANEOUS at 06:39

## 2018-04-18 RX ADMIN — ATORVASTATIN CALCIUM SCH MG: 40 TABLET, FILM COATED ORAL at 21:26

## 2018-04-18 RX ADMIN — HEPARIN SODIUM SCH UNIT: 5000 INJECTION, SOLUTION INTRAVENOUS; SUBCUTANEOUS at 21:26

## 2018-04-18 RX ADMIN — INSULIN ASPART SCH: 100 INJECTION, SOLUTION INTRAVENOUS; SUBCUTANEOUS at 16:32

## 2018-04-18 RX ADMIN — POTASSIUM CHLORIDE SCH MEQ: 1500 TABLET, EXTENDED RELEASE ORAL at 09:19

## 2018-04-18 RX ADMIN — PRAMIPEXOLE DIHYDROCHLORIDE SCH MG: 0.12 TABLET ORAL at 06:39

## 2018-04-18 RX ADMIN — ASPIRIN 81 MG SCH MG: 81 TABLET ORAL at 09:19

## 2018-04-18 RX ADMIN — HEPARIN SODIUM SCH UNIT: 5000 INJECTION, SOLUTION INTRAVENOUS; SUBCUTANEOUS at 13:35

## 2018-04-18 NOTE — PN
Progress Note (short form)





- Note


Progress Note: 








CC: BLE pain





S:  LE pain improving, still with focal tenderness over right big toe and left 

2nd toe.  no cp, palps, sob, dizziness, le edema.   


 





 


 


 Current Medications





Acetaminophen (Tylenol Oral Solution -)  650 mg PO Q4H PRN


   PRN Reason: PAIN LEVEL 1-5


   Last Admin: 04/18/18 11:27 Dose:  650 mg


Aspirin (Asa -)  81 mg PO DAILY [facility]


   Last Admin: 04/18/18 09:19 Dose:  81 mg


Atorvastatin Calcium (Lipitor -)  40 mg PO HS [facility]


   Last Admin: 04/17/18 21:13 Dose:  40 mg


Carbidopa/Levodopa (Sinemet *Cr* 50/200 -)  1 combo PO 0700,1200,1700 [facility]


   Stop: 04/20/18 12:01


   Last Admin: 04/18/18 11:26 Dose:  1 combo


Donepezil HCl (Aricept -)  10 mg PO [facility]


   Last Admin: 04/17/18 21:13 Dose:  10 mg


Gabapentin (Neurontin -)  100 mg PO BID [facility]


Gabapentin (Neurontin -)  100 mg PO HS [facility]


   Stop: 04/19/18 21:59


Heparin Sodium (Porcine) (Heparin -)  5,000 unit SQ TID [facility]


   Last Admin: 04/18/18 13:35 Dose:  5,000 unit


Insulin Aspart (Novolog Vial Sliding Scale -)  1 vial SQ ACHS [facility]


   PRN Reason: Protocol


Metoprolol Succinate (Toprol Xl -)  25 mg PO DAILY [facility]


   Last Admin: 04/18/18 09:19 Dose:  25 mg


Potassium Chloride (K-Dur -)  20 meq PO DAILY [facility]


   Last Admin: 04/18/18 09:19 Dose:  20 meq


Pramipexole Dihydrochloride (Mirapex -)  0.125 mg PO 0700,1200,1700 [facility]


   Stop: 04/20/18 12:01


   Last Admin: 04/18/18 11:27 Dose:  0.125 mg


Pramipexole Dihydrochloride (Mirapex -)  0.25 mg PO 0700,1200,1700 [facility]


Tamsulosin HCl (Flomax -)  0.4 mg PO DAILY@0830 [facility]


   Last Admin: 04/18/18 08:10 Dose:  0.4 mg


Torsemide (Demadex -)  100 mg PO DAILY [facility]


   Last Admin: 04/18/18 09:19 Dose:  100 mg

















Physical Exam


Vital Signs: 


 





 





 





 Vital Signs - 24 hr











  04/17/18 04/17/18 04/17/18





  15:48 18:31 21:00


 


Temperature 97.7 F 97.2 F L 


 


Pulse Rate 70 70 


 


Respiratory 18 18 18





Rate   


 


Blood Pressure 116/58 107/61 


 


O2 Sat by Pulse   98





Oximetry (%)   














  04/18/18 04/18/18 04/18/18





  02:00 09:00 10:00


 


Temperature 97.7 F  98.6 F


 


Pulse Rate 75  72


 


Respiratory 18  18





Rate   


 


Blood Pressure 119/61  142/77


 


O2 Sat by Pulse  96 





Oximetry (%)   














  04/18/18





  14:47


 


Temperature 97.9 F


 


Pulse Rate 70


 


Respiratory 20





Rate 


 


Blood Pressure 130/68


 


O2 Sat by Pulse 





Oximetry (%) 














 Intake & Output











 04/16/18 04/17/18 04/18/18 04/19/18





 07:59 07:59 07:59 07:59


 


Intake Total 1530 850 900 


 


Output Total 1900 2400 2400 


 


Balance -370 -1550 -1500 


 


Weight  237 lb 6 oz  

















Constitutional: Yes: No Distress, Calm


Eyes: Yes: WNL, Conjunctiva Clear


HENT: Yes: WNL


Neck: Yes: WNL


Cardiovascular: Yes: Regular Rate and Rhythm, nl s1, s2.  no mrg.  JVD flat. 


Respiratory: Yes: CTA Bilaterally


Gastrointestinal: Yes: Normal Bowel Sounds


Extremities:  tenderness over right big toe and left 2nd toe with overlying 

erythema


Edema: No 


Labs: 


 





 


 CBC, BMP





 04/15/18 06:48 





 04/17/18 06:00 














Imaging





- Results


EKG: Image Reviewed (Done at 4/14/2018 at 19:19 NSR at 89/min with RBBB and 

lateral T-wave inversions)





echo 4/2018:  mild-mod dec LV fn.  EF 40-45%.  (global).  nl rv size/fn.  1+ ar

, 1+ mac/mr. 





Echo 12/2017 focused echo with contrast for wall motion: EF 45-50%.  global HK 

with additional HK of basal inferior wall.  can't exclude additional HK of mid-

to-distal lat wall and anterior apex.  paradoxical/dyssynergistic septal motion 

c/w post-op status.   


  


cath msh2/2018: LVEDP/wedge 10.  RA 5, RV 25/5, PA 25-15 mpap 18.   CO 3.9, PVR 

2.1 


dLM 50-60%, pLAD mod disease, fills via LIMA, D1 (small) severe diffuse 

disease.  pLCx focal 70-80%, OM1 mod dz, fills via SVG. LPL1, mild dz. pRamus 90

-95%, fills via SVG.  pRCA 30-50%, mRCA 70-80%, AV continuation 90-95%.  Patent 

lima to lad, [id number].  Total occlusion of svg-rca/rpda.  EF 50% with mild 

hk of diaphragmatic and anterolateral wall. 








Assessment/Plan





83 yo male with HTN, ischemic cardiomyopathy, CAD s/p CABG, NIDDM, osteo of 

left foot and Parkinson's Dementia who p/w bilateral foot pain and elevated BNP





 





1) Elevated BNP/known ischemic cardiomyopathy


-Edema and CXR with pulmonary venous congestion 


-Would continue Torsemide 100mg daily and reassess.  Patient torsemide dose had 

been decreased to 60 mg/day recently as outpatient (previously on 100 mg twice 

a week and 50 mg the other days).  Con't to reassess volume status on torsemide 

100 mg/day.  currently remains euvolemic.  If begins to look dry can decrease 

to 80 mg daily. 


- daily bmp, weights, i/o's. lyte repletion prn. 


-Trop negative and no DVT on duplex


-echo as above. 





2) CAD


-S/p CABG.  Recent cath with residual disease (medically managed).  


-Stable without ischemic symptoms


-con't asa, statin





3) HTN


-Well controlled


-Continue outpatient antihypertensive regimen





4) DM


-Primary team recs





5) LE pain 


- MRI march couldn't exclude osteo of left toe.  s/p abx course at that time.  

Now with elevated ESR, CRP, uric acid.  ? gout.  colchicine started.  

Gabapentin started for possible neuropathy.  neuro, podiatry, following.  


- LE duplex ultrasound negative for DVT.

## 2018-04-18 NOTE — PN
Physical Exam: 


SUBJECTIVE: Patient seen and examined. He says his pain is worse today, he dose 

not want to walk with PT 








OBJECTIVE:





 Vital Signs











 Period  Temp  Pulse  Resp  BP Sys/Lowe  Pulse Ox


 


 Last 24 Hr  97.2 F-98.6 F  70-75  18-18  107-142/58-77  96-98








PE


Neuro: alert, awake, cn 2-12intact


Pulm: CTAB


CV: s1 s2 rrr no mrg


Abd: s nt nd + bs


ExT: b/l lower ext tenderness, redness overlying r bunion, swelling 











 Laboratory Results - last 24 hr











  04/17/18 04/17/18 04/18/18





  16:07 20:57 06:10


 


POC Glucometer  140  117  114














  04/18/18





  11:10


 


POC Glucometer  122








Active Medications











Generic Name Dose Route Start Last Admin





  Trade Name Freq  PRN Reason Stop Dose Admin


 


Acetaminophen  650 mg  04/18/18 11:05  04/18/18 11:27





  Tylenol Oral Solution -  PO   650 mg





  Q4H PRN   Administration





  PAIN LEVEL 1-5   


 


Aspirin  81 mg  04/15/18 10:00  04/18/18 09:19





  Asa -  PO   81 mg





  DAILY [facility]   Administration


 


Atorvastatin Calcium  40 mg  04/15/18 22:00  04/17/18 21:13





  Lipitor -  PO   40 mg





  HS [facility]   Administration


 


Carbidopa/Levodopa  1 combo  04/17/18 12:00  04/18/18 11:26





  Sinemet *Cr* 50/200 -  PO  04/20/18 12:01  1 combo





  0700,1200,1700 [facility]   Administration


 


Donepezil HCl  10 mg  04/15/18 22:00  04/17/18 21:13





  Aricept -  PO   10 mg





  HS [facility]   Administration


 


Heparin Sodium (Porcine)  5,000 unit  04/14/18 22:00  04/18/18 13:35





  Heparin -  SQ   5,000 unit





  TID [facility]   Administration


 


Insulin Aspart  1 vial  04/18/18 11:06  





  Novolog Vial Sliding Scale -  SQ   





  [facility]   





  Protocol   


 


Metoprolol Succinate  25 mg  04/15/18 10:00  04/18/18 09:19





  Toprol Xl -  PO   25 mg





  DAILY [facility]   Administration


 


Potassium Chloride  20 meq  04/15/18 10:00  04/18/18 09:19





  K-Dur -  PO   20 meq





  DAILY [facility]   Administration


 


Pramipexole Dihydrochloride  0.125 mg  04/17/18 12:00  04/18/18 11:27





  Mirapex -  PO  04/20/18 12:01  0.125 mg





  0700,1200,1700 [facility]   Administration


 


Pramipexole Dihydrochloride  0.25 mg  04/20/18 17:00  





  Mirapex -  PO   





  0700,1200,1700 [facility]   


 


Tamsulosin HCl  0.4 mg  04/15/18 08:30  04/18/18 08:10





  Flomax -  PO   0.4 mg





  DAILY@0830 [facility]   Administration


 


Torsemide  100 mg  04/15/18 10:00  04/18/18 09:19





  Demadex -  PO   100 mg





  DAILY [facility]   Administration








Imaging: 


ECHO  4/16: LV mild to moderately reduced, EF 40-45%, mild to moderate global 

hypkinesis; RV normal; mild MR; mild AI; mild PI 





Assessment: 82 year old man with a PMH significant for HTN, CAD s/p CABG x 5v, 

NIDDM, and possible osteomyelitis of left foot. Placed on observation for 

bilateral lower extremity edema, pain, and inability to weight bear.





Plan: 


1. Systolic heart failure, newly diagnosed 


- Maintain increased Torsemide 100mg daily 


- Daily weight, not done today 





2. Hypertension


- Continue Toprol XL, Torsemide





3. CAD 


- Continue Toprol XL, Lipitor, ASA





4. DM II 


- ISS, BGM ACHS 





5. Bilateral foot pain/Hx osteomyelitis left foot


- No acute infectious signs 


- Arterial dopplers ordered by Dr. Holley, can be done as outpatient 1-2 

weeks 


- SNF 


- Start HS dose of gabapentin, will need outpt up titration 





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 

disease/ Possible Restless Leg Syndrome 


- Continue carbidopa/levodopa; pramipexole added 0.125mg TID for 3 days (Day: 2 

of 3) , then 0.25mg TID thereafter


- Dosing to be done at 7, 12, 5





7. Acute gout


- Given colchicine regimen 


- Consider allopurinol initiation with PCP for prevention of future flares 





8. Gait Instability


- Likely multifactorial: Parkinsonism +/- organic brain syndrome +/- bilateral 

foot pain/osteo +/- lower extremity edema from CHF +/- gout flare


- Needs rehab placement





9. DVT ppx


- subq heparin





Dispo: continues to require inpatient care. SW working on SNF placement, wife 

agrees. Full code.








Visit type





- Emergency Visit


Emergency Visit: Yes


ED Registration Date: 04/15/18


Care time: The patient presented to the Emergency Department on the above date 

and was hospitalized for further evaluation of their emergent condition.





- New Patient


This patient is new to me today: Yes


Date on this admission: 04/18/18





- Critical Care


Critical Care patient: No

## 2018-04-19 VITALS — DIASTOLIC BLOOD PRESSURE: 64 MMHG | TEMPERATURE: 97.4 F | HEART RATE: 69 BPM | SYSTOLIC BLOOD PRESSURE: 116 MMHG

## 2018-04-19 LAB
ANION GAP SERPL CALC-SCNC: 7 MMOL/L (ref 8–16)
BUN SERPL-MCNC: 19 MG/DL (ref 7–18)
CALCIUM SERPL-MCNC: 8.3 MG/DL (ref 8.5–10.1)
CHLORIDE SERPL-SCNC: 102 MMOL/L (ref 98–107)
CO2 SERPL-SCNC: 30 MMOL/L (ref 21–32)
CREAT SERPL-MCNC: 1 MG/DL (ref 0.7–1.3)
GLUCOSE SERPL-MCNC: 117 MG/DL (ref 74–106)
POTASSIUM SERPLBLD-SCNC: 3.5 MMOL/L (ref 3.5–5.1)
SODIUM SERPL-SCNC: 139 MMOL/L (ref 136–145)

## 2018-04-19 RX ADMIN — INSULIN ASPART SCH: 100 INJECTION, SOLUTION INTRAVENOUS; SUBCUTANEOUS at 06:10

## 2018-04-19 RX ADMIN — PRAMIPEXOLE DIHYDROCHLORIDE SCH MG: 0.25 TABLET ORAL at 16:17

## 2018-04-19 RX ADMIN — HEPARIN SODIUM SCH UNIT: 5000 INJECTION, SOLUTION INTRAVENOUS; SUBCUTANEOUS at 14:51

## 2018-04-19 RX ADMIN — INSULIN ASPART SCH: 100 INJECTION, SOLUTION INTRAVENOUS; SUBCUTANEOUS at 12:06

## 2018-04-19 RX ADMIN — HEPARIN SODIUM SCH UNIT: 5000 INJECTION, SOLUTION INTRAVENOUS; SUBCUTANEOUS at 06:10

## 2018-04-19 RX ADMIN — TAMSULOSIN HYDROCHLORIDE SCH MG: 0.4 CAPSULE ORAL at 09:18

## 2018-04-19 RX ADMIN — ASPIRIN 81 MG SCH MG: 81 TABLET ORAL at 09:18

## 2018-04-19 RX ADMIN — POTASSIUM CHLORIDE SCH MEQ: 1500 TABLET, EXTENDED RELEASE ORAL at 09:18

## 2018-04-19 RX ADMIN — PRAMIPEXOLE DIHYDROCHLORIDE SCH MG: 0.25 TABLET ORAL at 12:15

## 2018-04-19 RX ADMIN — TORSEMIDE SCH MG: 100 TABLET ORAL at 09:34

## 2018-04-19 RX ADMIN — PRAMIPEXOLE DIHYDROCHLORIDE SCH MG: 0.25 TABLET ORAL at 06:13

## 2018-04-19 RX ADMIN — INSULIN ASPART SCH: 100 INJECTION, SOLUTION INTRAVENOUS; SUBCUTANEOUS at 16:15

## 2018-04-19 NOTE — DS
Physical Exam: 


SUBJECTIVE: Patient seen and examined. Pain is less today, he ambulated with 

PT. Saw patient with vascular today. 








OBJECTIVE:





 Vital Signs











 Period  Temp  Pulse  Resp  BP Sys/Lowe  Pulse Ox


 


 Last 24 Hr  97.4 F-98.8 F  64-93  18-20  104-145/59-69  94-96











PE


Neuro: alert, awake, cn 2-12intact


Pulm: CTAB


CV: s1 s2 rrr no mrg


Abd: s nt nd + bs


ExT: b/l lower ext tenderness- improved, redness overlying r bunion- improved/

resolved + dp pulses b/l 


 





 Laboratory Results - last 24 hr











  04/18/18 04/18/18 04/18/18





  16:24 17:15 21:24


 


Sodium   140 


 


Potassium   3.7 


 


Chloride   102 


 


Carbon Dioxide   31 


 


Anion Gap   7 L 


 


BUN   19 H 


 


Creatinine   0.9 


 


POC Glucometer  139   97


 


Random Glucose   108 H 


 


Calcium   8.1 L 


 


TSH   1.95 














  04/19/18 04/19/18 04/19/18





  06:08 06:30 11:51


 


Sodium   139 


 


Potassium   3.5 


 


Chloride   102 


 


Carbon Dioxide   30 


 


Anion Gap   7 L 


 


BUN   19 H 


 


Creatinine   1.0 


 


POC Glucometer  132   106


 


Random Glucose   117 H 


 


Calcium   8.3 L 


 


TSH   











HOSPITAL COURSE:





Date of Admission:04/15/18





Date of Discharge: 04/19/18











Minutes to complete discharge: 37





Discharge Summary


Reason For Visit: LOCALIZED SWELLING OF BOTH LOWER LEGS


Current Active Problems





Gait instability (Acute)


Localized swelling of both lower legs (Acute)








Hospital Course: 


Initial Hospital Course:


This 82 year old male with a PMH significant for HTN, CAD s/p CABG x 5, NIDDM, 

possible osteomyelitis of left foot, Parkinson's disease and dementia, new CHF, 

presented with a complaint of bilateral leg pain and swelling and inability to 

walk or weight bear. Patient had these symptoms for two months, his wife says 

one week.  Patient was hospitalized from 3/1-3/6 for cellulitis and an MRI on 3/

2 showed possible osteo of left foot was sent home with PO augmentin. Patient 

has been followed by Dr. Holley. 





Imaging: 


ECHO 4/16: LV mild to moderately reduced, EF 40-45%, mild to moderate global 

hypokinesis; RV normal; mild MR; mild AI; mild PI 





Subsequent Hospital Course/Progress Note/DC summary: 





Assessment: 82 year old man with a PMH significant for HTN, CAD s/p CABG x 5v, 

NIDDM, and possible osteomyelitis of left foot admitted with bilateral lower 

extremity edema, pain, and inability to weight bear.





Plan: 


1. Systolic heart failure, newly diagnosed 


- Maintain increased Torsemide 100mg daily 


- Cardiology follow up 





2. Hypertension


- Continue Toprol XL, Torsemide





3. CAD 


- Continue Toprol XL, Lipitor, ASA





4. DM II 


- Resume levemir 


- Resume PO metformin 





5. Bilateral foot pain/Hx osteomyelitis left foot


- No acute infectious signs 


- Arterial dopplers done in 2/2018 shows extensive atherosclerotic disease with 

abnormal distal flow bilaterally, right left side show triphasic flow 

throughout common and superficial femoral arteries 


- Pt seen and evaluated by Vascular, no acute intervention at this time, +DP 

pulses, conservative mgmt 


- Started Gabapentin BID, will need outpt up titration 





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 

disease/ Possible Restless Leg Syndrome 


- Continue carbidopa/levodopa; pramipexole 0.25mg TID 


- Dosing to be done at 7, 12, 5





7. Acute gout


- Given colchicine x2 


- Consider allopurinol initiation with PCP for prevention of future flares 





8. Gait Instability


- Likely multifactorial: Parkinsonism +/- organic brain syndrome +/- bilateral 

foot pain/osteo +/- lower extremity edema from CHF +/- gout flare


- Rehab placement 





Dispo:


- SNF placement 


- Continue meds as listed 


- PCP, podiatry and cardiology, neuro follow up 


Condition: Stable





- Instructions


Diet, Activity, Other Instructions: 


Please return to the ED for any new, persistent, or worsening symptoms. Follow 

up with your PCP in 2 weeks. 


Take home medications as directed on discharge medication list 


New medications have been sent to your pharmacy note new med: Miramex 


Follow up with Dr. Garcia neurologist as out pt 


Follow up with Dr. Singh when discharged from rehab facility for further 

work up 


Referrals: 


[facility] [Outside]


Rohit Holley MD [Staff Physician] - 


Sherry Lu MD [Staff Physician] - 


Pito Brooks MD [Primary Care Provider] - 


Shashi Garcia MD [Staff Physician] - 


Disposition: SKILLED NURSING FACILITY





- Home Medications


Comprehensive Discharge Medication List: 


Ambulatory Orders





Metoprolol Succinate [Toprol XL -] 25 mg PO DAILY 04/15/15 


Potassium Chloride 20 meq PO DAILY 04/15/15 


Tamsulosin HCl 0.4 mg PO DAILY 04/15/15 


Aspirin 81 mg PO DAILY 03/01/18 


Atorvastatin Ca [Lipitor] 40 mg PO DAILY 03/01/18 


Metformin HCl 500 mg PO TID 03/01/18 


Donepezil HCl [Aricept] 10 mg PO DAILY 03/02/18 


Carbidopa/Levodopa *Cr* 50/200 [Sinemet *Cr* 50/200 -] 1 combo PO 0700,1200,

1700 #90 tablet.er 04/19/18 


Gabapentin [Neurontin -] 100 mg PO BID #30 capsule 04/19/18 


Pramipexole Dihydrochloride [Mirapex -] 0.25 mg PO 0700,1200,1700 #90 tablet 04/ 19/18 


Torsemide [Demadex -] 100 mg PO DAILY #30 tablet 04/19/18 








This patient is new to me today: No


Emergency Visit: Yes


ED Registration Date: 04/15/18


Care time: The patient presented to the Emergency Department on the above date 

and was hospitalized for further evaluation of their emergent condition.


Critical Care patient: No





- Discharge Referral


Referred to [facility] Yes


Physician Referral: Pito Mims MD ([facility])

## 2019-08-14 ENCOUNTER — HOSPITAL ENCOUNTER (INPATIENT)
Dept: HOSPITAL 74 - JER | Age: 84
LOS: 6 days | Discharge: HOME | DRG: 309 | End: 2019-08-20
Attending: NURSE PRACTITIONER | Admitting: INTERNAL MEDICINE
Payer: COMMERCIAL

## 2019-08-14 VITALS — BODY MASS INDEX: 36.8 KG/M2

## 2019-08-14 DIAGNOSIS — E11.9: ICD-10-CM

## 2019-08-14 DIAGNOSIS — N18.9: ICD-10-CM

## 2019-08-14 DIAGNOSIS — I48.92: Primary | ICD-10-CM

## 2019-08-14 DIAGNOSIS — Z95.1: ICD-10-CM

## 2019-08-14 DIAGNOSIS — I13.0: ICD-10-CM

## 2019-08-14 DIAGNOSIS — J98.11: ICD-10-CM

## 2019-08-14 DIAGNOSIS — F03.90: ICD-10-CM

## 2019-08-14 DIAGNOSIS — R26.81: ICD-10-CM

## 2019-08-14 DIAGNOSIS — I42.9: ICD-10-CM

## 2019-08-14 DIAGNOSIS — R00.0: ICD-10-CM

## 2019-08-14 DIAGNOSIS — I48.91: ICD-10-CM

## 2019-08-14 DIAGNOSIS — I25.10: ICD-10-CM

## 2019-08-14 DIAGNOSIS — N40.0: ICD-10-CM

## 2019-08-14 DIAGNOSIS — E78.5: ICD-10-CM

## 2019-08-14 DIAGNOSIS — I50.9: ICD-10-CM

## 2019-08-14 DIAGNOSIS — G20: ICD-10-CM

## 2019-08-14 DIAGNOSIS — I11.0: ICD-10-CM

## 2019-08-14 DIAGNOSIS — R22.43: ICD-10-CM

## 2019-08-14 LAB
ALBUMIN SERPL-MCNC: 3.5 G/DL (ref 3.4–5)
ALP SERPL-CCNC: 69 U/L (ref 45–117)
ALT SERPL-CCNC: 17 U/L (ref 13–61)
ANION GAP SERPL CALC-SCNC: 11 MMOL/L (ref 8–16)
AST SERPL-CCNC: 32 U/L (ref 15–37)
BASOPHILS # BLD: 0.8 % (ref 0–2)
BILIRUB SERPL-MCNC: 0.8 MG/DL (ref 0.2–1)
BNP SERPL-MCNC: (no result) PG/ML (ref 5–450)
BUN SERPL-MCNC: 25.3 MG/DL (ref 7–18)
CALCIUM SERPL-MCNC: 8.8 MG/DL (ref 8.5–10.1)
CHLORIDE SERPL-SCNC: 105 MMOL/L (ref 98–107)
CO2 SERPL-SCNC: 25 MMOL/L (ref 21–32)
CREAT SERPL-MCNC: 1.7 MG/DL (ref 0.55–1.3)
DEPRECATED RDW RBC AUTO: 15.2 % (ref 11.9–15.9)
EOSINOPHIL # BLD: 1.1 % (ref 0–4.5)
GLUCOSE SERPL-MCNC: 136 MG/DL (ref 74–106)
HCT VFR BLD CALC: 40 % (ref 35.4–49)
HGB BLD-MCNC: 13.5 GM/DL (ref 11.7–16.9)
LYMPHOCYTES # BLD: 20.3 % (ref 8–40)
MCH RBC QN AUTO: 29.4 PG (ref 25.7–33.7)
MCHC RBC AUTO-ENTMCNC: 33.6 G/DL (ref 32–35.9)
MCV RBC: 87.6 FL (ref 80–96)
MONOCYTES # BLD AUTO: 9.3 % (ref 3.8–10.2)
NEUTROPHILS # BLD: 68.5 % (ref 42.8–82.8)
PLATELET # BLD AUTO: 207 K/MM3 (ref 134–434)
PMV BLD: 8.3 FL (ref 7.5–11.1)
POTASSIUM SERPLBLD-SCNC: 4.4 MMOL/L (ref 3.5–5.1)
PROT SERPL-MCNC: 6.4 G/DL (ref 6.4–8.2)
RBC # BLD AUTO: 4.57 M/MM3 (ref 4–5.6)
SODIUM SERPL-SCNC: 141 MMOL/L (ref 136–145)
WBC # BLD AUTO: 4.9 K/MM3 (ref 4–10)

## 2019-08-14 PROCEDURE — A9502 TC99M TETROFOSMIN: HCPCS

## 2019-08-14 RX ADMIN — DIGOXIN SCH MG: 250 TABLET ORAL at 22:18

## 2019-08-14 RX ADMIN — INSULIN ASPART SCH: 100 INJECTION, SOLUTION INTRAVENOUS; SUBCUTANEOUS at 22:21

## 2019-08-14 RX ADMIN — MEMANTINE HYDROCHLORIDE SCH MG: 5 TABLET ORAL at 22:18

## 2019-08-14 RX ADMIN — APIXABAN SCH MG: 2.5 TABLET, FILM COATED ORAL at 22:18

## 2019-08-14 NOTE — HP
Admitting History and Physical





- Primary Care Physician


PCP: Pito Brooks





- Admission


Chief Complaint: sent by Dr Youngblood after seeing in office for c/o SOB & 

palpitations


History of Present Illness: 








83 yo male  with a significant PMH of AF  DM, HTN, BPH, CAD s/p CABG x 5 (2010)

,  who presents to the ED after being seen in PCP office (Dr Youngblood) with 

shortness of breath & palpitations that lasted 30 minutes, according to wife. 

Clay does have VEE & orthopnea sleeping w/ 2 pillows. Baseline leg edema 

that wifes states has been worse over last week.  Patient went to PCP office 

today, and on exam was diaphoretic, pale, & tachycardic. EMS was called for 

transfer to ED.  


 


History Source: Patient, Family Member (wife)


Limitations to Obtaining History: Dementia (mild), Poor Historian





- Past Medical History


CNS: Yes: Parkinson's


Cardiovascular: Yes: AFIB, CAD, CHF, HTN





- Past Surgical History


Past Surgical History: Yes: CABG (2010 x5v), Joint Replacement (Left TKR)





- Smoking History


Smoking history: Never smoked


Have you smoked in the past 12 months: No





- Alcohol/Substance Use


Hx Alcohol Use: No





- Social History


Usual Living Arrangement: Yes: With Spouse


ADL: Family Assistance


Occupation: retired


History of Recent Travel: No





Home Medications





- Allergies


Allergies/Adverse Reactions: 


 Allergies











Allergy/AdvReac Type Severity Reaction Status Date / Time


 


No Known Allergies Allergy   Verified 08/14/19 12:55














- Home Medications


Home Medications: 


Ambulatory Orders





Potassium Chloride 20 meq PO DAILY 04/15/15 


Tamsulosin HCl 0.4 mg PO DAILY 04/15/15 


Aspirin 81 mg PO DAILY 03/01/18 


Atorvastatin Ca [Lipitor] 20 mg PO DAILY 03/01/18 


metFORMIN HCL [Metformin HCl] 500 mg PO BID 03/01/18 


Donepezil HCl [Aricept] 10 mg PO DAILY 03/02/18 


Carbidopa/Levodopa *Cr* 50/200 [Sinemet *Cr* 50/200 -] 1 combo PO 0700,1200,

1700 #90 tablet.er 04/19/18 


Apixaban [Eliquis -] 2.5 mg PO BID 08/14/19 


Cholecalciferol (Vitamin D3) [Vitamin D3 -] 50,000 unit PO WEEKLY 08/14/19 


Diltiazem HCl [Cartia Xt] 120 mg PO DAILY 08/14/19 


Memantine HCl [Namenda -] 5 mg PO BID 08/14/19 


Quetiapine Fumarate [Seroquel -] 25 mg PO HS 08/14/19 


Torsemide [Demadex -] 20 mg PO DAILY 08/14/19 











Family Disease History





- Family Disease History


Family History: Unable to Obtain (not know by wife)





Review of Systems





- Review of Systems


Constitutional: reports: No Symptoms, Diaphoresis, Lethargy


Eyes: reports: No Symptoms


HENT: reports: No Symptoms


Neck: reports: No Symptoms


Cardiovascular: reports: Chest Pain, Palpitations, Shortness of Breath


Respiratory: reports: Exercise Intolerance, Orthopnea, SOB, SOB on Exertion


Gastrointestinal: reports: Other (decreased appetite)


Genitourinary: reports: No Symptoms


Breasts: reports: No Symptoms Reported


Musculoskeletal: reports: No Symptoms


Integumentary: reports: No Symptoms


Neurological: reports: Confusion (at baseline)


Endocrine: reports: Increased Thirst


Hematology/Lymphatic: reports: No Symptoms


Psychiatric: reports: No Symptoms





Physical Examination


Vital Signs: 


 Vital Signs











Temperature  96.3 F L  08/14/19 13:07


 


Pulse Rate  115 H  08/14/19 15:14


 


Respiratory Rate  18   08/14/19 15:14


 


Blood Pressure  96/74   08/14/19 15:14


 


O2 Sat by Pulse Oximetry (%)  98   08/14/19 15:14











Constitutional: Yes: Well Nourished, No Distress, Calm


Eyes: Yes: WNL, Conjunctiva Clear, EOM Intact


HENT: Yes: WNL, Atraumatic, Normocephalic


Neck: Yes: WNL, Supple, Trachea Midline


Cardiovascular: Yes: Tachycardia, Pulse Irregular


Respiratory: Yes: Regular, Diminished (at bases)


Gastrointestinal: Yes: WNL, Normal Bowel Sounds, Soft


...Rectal Exam: Yes: Deferred


Renal/: Yes: WNL


Breast(s): Yes: WNL


Musculoskeletal: Yes: WNL


Extremities: Yes: WNL


Edema: LLE: 2+, RLE: 2+


Peripheral Pulses WNL: Yes


Integumentary: Yes: WNL


Neurological: Yes: Alert, Confusion (at baseline)


...Motor Strength: WNL


Psychiatric: Yes: Alert, Oriented (to person and place)


Labs: 


 CBC, BMP





 08/14/19 13:44 





 08/14/19 13:44 











Imaging





- Results


Chest X-ray: Image Reviewed (Increased PVC, atelectasis to left, right effusion)





Problem List





- Problems


(1) HLD (hyperlipidemia)


Assessment/Plan: 


low fat low cholesterol diet


c/w atorvastin 


Code(s): E78.5 - HYPERLIPIDEMIA, UNSPECIFIED   





(2) Atrial fibrillation with rapid ventricular response


Assessment/Plan: 


AF w/ RVR on CM and EKG


diltaizem IV 15 mg given in ED with decreased in HR


c/w IV diltiazem for rate control


consider a diltiazern gtt if not controlled


change diltaizem CD to ER, 30mg q6h, titrating up if not rate controlled if BP 

allows


will send TSH level


maintain K>4.0 , Mg >2,0


TTE oredered, recent 4/18 EF 40/45%, no TR, Mild MR, LV fx mildly reduced





Code(s): I48.91 - UNSPECIFIED ATRIAL FIBRILLATION   





(3) CHF (congestive heart failure)


Assessment/Plan: 


EF on recent TTE 40-45%


c/w home dose of torsemide


uptitrate as needed given BNP 10K with close attention to Cr (baseline 1.3 in 

2017, now 1.7)


cadiology consultation requested with Dr Calderón (saw in past)





Code(s): I50.9 - HEART FAILURE, UNSPECIFIED   





(4) Gait instability


Assessment/Plan: 


PT request placed


fall precautions


Code(s): R26.81 - UNSTEADINESS ON FEET   





(5) Localized swelling of both lower legs


Assessment/Plan: 


c/w torsemide uptitrating if needed


elevate legs while in bed


Code(s): R22.43 - LOCALIZED SWELLING, MASS AND LUMP, LOWER LIMB, BILATERAL   





(6) Diabetes


Assessment/Plan: 


BGM AC/HS with novolog sliding scale


hold metformin


HgbA1c pending


Code(s): E11.9 - TYPE 2 DIABETES MELLITUS WITHOUT COMPLICATIONS   





(7) HTN (hypertension)


Assessment/Plan: 


low fat low cholesterol diet


normotensive presently


c/w diltiazem


Code(s): I10 - ESSENTIAL (PRIMARY) HYPERTENSION   





(8) Parkinson disease


Assessment/Plan: 


c/w sinemet


PT requested


Code(s): G20 - PARKINSON'S DISEASE   





(9) Dementia


Assessment/Plan: 


c/w namenda & aricept


hold  quetiapine, QTc 578


daily EKG to assess QTc


fall precautions


frequent orientation


encourage family visitation


Code(s): F03.90 - UNSPECIFIED DEMENTIA WITHOUT BEHAVIORAL DISTURBANCE   





(10) Prophylactic measure


Assessment/Plan: 


FEN


no need for additional IVF


cardiac/diabetic diet


monitor electrolytes





DVT


heparin sq bid





Dispo


admit to telemetry


full code


discharge planning


Code(s): Z29.9 - ENCOUNTER FOR PROPHYLACTIC MEASURES, UNSPECIFIED   





Visit type





- Emergency Visit


Emergency Visit: Yes


ED Registration Date: 08/14/19


Care time: The patient presented to the Emergency Department on the above date 

and was hospitalized for further evaluation of their emergent condition.





- New Patient


This patient is new to me today: Yes


Date on this admission: 08/14/19





- Critical Care


Critical Care patient: No

## 2019-08-14 NOTE — PDOC
Documentation entered by Rosalind Camacho SCRIBE, acting as scribe for Ani Worley MD.








Ani Worley MD:  This documentation has been prepared by the Doug fonseca Brenda, SCRIBE, under my direction and personally reviewed by me in its 

entirety.  I confirm that the documentation accurately reflects all work, 

treatment, procedures, and medical decision making performed by me.  





History of Present Illness





- General


Chief Complaint: Shortness of Breath


Stated Complaint: SOB


Time Seen by Provider: 08/14/19 13:07


History Source: Patient


Exam Limitations: No Limitations





- History of Present Illness


Initial Comments: 





08/14/19 13:51


83 yo male  with a significant PMH of Afib,  DM, HTN, BPH, CAD s/p CABG (2010),

  who presents to the emergency department with shortness of breath, 

tachycardia and paleness that lasted 30 minutes, according to wife. does have 

exertional dyspnea, orthopnea. no f/c no cough. baseline leg edema. states his 

heart is racing all of the time. 


pt went to pcp office today, was found to be diaphoretic and sob, and the 

office called ambulance for transfer to ED.


 The wife also reports that he has been having a heart fast rhythm for over a 

month now. 





The patient denies chest pain, headache and dizziness.


Denies fever, chills, nausea, vomiting, diarrhea and constipation.


Denies any urinary symptoms. 





Allergies: NKA


Past surgical history: Cabbage 5/2010


Social history: unknown if ever smoked. 


Neuro: Jose 


Cardio: Jose Rafael 


PCP: Cecilia 


08/14/19 15:50





08/14/19 15:52








Past History





- Past Medical History


Allergies/Adverse Reactions: 


 Allergies











Allergy/AdvReac Type Severity Reaction Status Date / Time


 


No Known Allergies Allergy   Verified 08/14/19 12:55











Home Medications: 


Ambulatory Orders





Potassium Chloride 20 meq PO DAILY 04/15/15 


Tamsulosin HCl 0.4 mg PO DAILY 04/15/15 


Aspirin 81 mg PO DAILY 03/01/18 


Atorvastatin Ca [Lipitor] 20 mg PO DAILY 03/01/18 


metFORMIN HCL [Metformin HCl] 500 mg PO BID 03/01/18 


Donepezil HCl [Aricept] 10 mg PO DAILY 03/02/18 


Carbidopa/Levodopa *Cr* 50/200 [Sinemet *Cr* 50/200 -] 1 combo PO 0700,1200,

1700 #90 tablet.er 04/19/18 


Apixaban [Eliquis -] 2.5 mg PO BID 08/14/19 


Cholecalciferol (Vitamin D3) [Vitamin D3 -] 50,000 unit PO WEEKLY 08/14/19 


Diltiazem HCl [Cartia Xt] 120 mg PO DAILY 08/14/19 


Memantine HCl [Namenda -] 5 mg PO BID 08/14/19 


Quetiapine Fumarate [Seroquel -] 25 mg PO HS 08/14/19 


Torsemide [Demadex -] 20 mg PO DAILY 08/14/19 








Anemia: No


Asthma: No


Cancer: No


Cardiac Disorders: Yes (CAD)


CVA: No


COPD: No


CHF: No


Dementia: No


Diabetes: Yes


GI Disorders: No


 Disorders: Yes (BPH)


HTN: Yes


Hypercholesterolemia: Yes


Liver Disease: No


Seizures: No


Thyroid Disease: No





- Surgical History


Cardiac Surgery: Yes (CABG 5/2010)





- Immunization History


Immunization Up to Date: Yes





- Suicide/Smoking/Psychosocial Hx


Smoking History: Unknown if ever smoked


Have you smoked in the past 12 months: No


Hx Alcohol Use: No


Drug/Substance Use Hx: No


Substance Use Type: None


Hx Substance Use Treatment: No





**Review of Systems





- Review of Systems


Able to Perform ROS?: Yes


Comments:: 





08/14/19 13:52


GENERAL/CONSTITUTIONAL: (+)Diaphoresis. No fever. No weakness.


HEAD, EYES, EARS, NOSE AND THROAT: No change in vision. No ear pain or 

discharge. No sore throat.


CARDIOVASCULAR: (+0 Shortness of breath. No chest pain.


RESPIRATORY: No cough, wheezing, or hemoptysis.


GASTROINTESTINAL: No nausea, vomiting, diarrhea or constipation.


GENITOURINARY: No dysuria, frequency, or change in urination.


MUSCULOSKELETAL: No joint or muscle swelling or pain. No neck or back pain.


SKIN: No rash


NEUROLOGIC: No headache, vertigo, loss of consciousness, or change in strength/

sensation.


ENDOCRINE: No increased thirst. No abnormal weight change.


HEMATOLOGIC/LYMPHATIC: No anemia, easy bleeding, or history of blood clots.


ALLERGIC/IMMUNOLOGIC: No hives or skin allergy.








*Physical Exam





- Vital Signs


 Last Vital Signs











Temp Pulse Resp BP Pulse Ox


 


 96.3 F L  120 H  18   109/78   96 


 


 08/14/19 13:07  08/14/19 12:30  08/14/19 12:30  08/14/19 12:30  08/14/19 12:30














- Physical Exam


Comments: 





08/14/19 15:51


awake alert lungs with crackles at bases bilaterally heart irreg tachycardia. 

abd soft nt nd ext wwp bilat edema.  pitting. edema. 2+ dp/ pt pulses bilat. 

nuero alert oriented x 3. 





ED Treatment Course





- LABORATORY


CBC & Chemistry Diagram: 


 08/14/19 13:44





 08/14/19 13:44





- RADIOLOGY


Radiology Studies Ordered: 














 Category Date Time Status


 


 CHEST X-RAY PORTABLE* [RAD] Stat Radiology  08/14/19 13:07 Completed














Medical Decision Making





- Medical Decision Making





08/14/19 13:37


83 yo male h/o chf cad cabg htn hld afib here with co palpitations sob, 

orthopnea and exdertional dyspena. pt was sent by his cardiologist for high 

heart rate. denies cp no f/c no worsening leg edema. has chronic edema.





on exam pt with fine crackles at bases, afib with rvr rapid irreg heart rate. 

bilat leg nonpitting edema.





afib with rvr. r/o chf anemia electrolyte abnoramlity mi. plan labs ekg 

diltiazem for rate control. will call pt cardiologist. for admission to 

telemetry.


08/14/19 15:32


d/w dr brooks, would like admitted to hospitalist. afib with rvr.


08/14/19 15:52


pt bp improved wtih diltiazem to 100's , then returned to 120. borderline bp. 


08/14/19 16:12


dW admitting team will admit to tele. 





*DC/Admit/Observation/Transfer


Diagnosis at time of Disposition: 


 Atrial fibrillation with RVR, CHF (congestive heart failure)








- Discharge Dispostion


Decision to Admit order: Yes





- Referrals


Referrals: 


Pito Brooks MD [Primary Care Provider] - 





- Patient Instructions





- Post Discharge Activity

## 2019-08-14 NOTE — PN
Progress Note (short form)





- Note


Progress Note: 





This is a patient of Dr. Mantilla w/ CAD and permanent AF who came to office 

today for an echo. While having echo, tech noted heart rate was persistently in 

120s in AF and she brought the patient to me for follow up. He was stable and 

had no complaints other than mild palpitations. His BP was on the low side, as 

it chronically is and I ordered him Digoxin and to f/u w/ Dr. Mantilla as 

outpatient.





His EF was also noted to be newly severely reduced- possibly due to a rate 

related myopathy; a stress test was also planned as outpatient.





He then went to his PMD's office for his routine exam and was then found to be 

diaphoretic and sent to ER.








REC:


1. Continue tele for rate control; add Digoxin 0.25mg for first 2-3 days, then 

drop to 0.125mg daily.


2. Continue Eliquis.


3. Avoid Metoprolol as he has had an adverse reaction to it and has refused it. 


4. Can Resume Cardizem  as he was on as outpt.


5. Will repeat echo when rate controlled. Stress test when rate controlled.








Full consult to follow in AM

## 2019-08-15 LAB
ALBUMIN SERPL-MCNC: 3.3 G/DL (ref 3.4–5)
ALP SERPL-CCNC: 63 U/L (ref 45–117)
ALT SERPL-CCNC: 25 U/L (ref 13–61)
ANION GAP SERPL CALC-SCNC: 10 MMOL/L (ref 8–16)
AST SERPL-CCNC: 30 U/L (ref 15–37)
BASOPHILS # BLD: 0.6 % (ref 0–2)
BILIRUB SERPL-MCNC: 1.1 MG/DL (ref 0.2–1)
BUN SERPL-MCNC: 24 MG/DL (ref 7–18)
CALCIUM SERPL-MCNC: 8.9 MG/DL (ref 8.5–10.1)
CHLORIDE SERPL-SCNC: 103 MMOL/L (ref 98–107)
CO2 SERPL-SCNC: 26 MMOL/L (ref 21–32)
CREAT SERPL-MCNC: 1.5 MG/DL (ref 0.55–1.3)
DEPRECATED RDW RBC AUTO: 15.3 % (ref 11.9–15.9)
EOSINOPHIL # BLD: 0.9 % (ref 0–4.5)
GLUCOSE SERPL-MCNC: 103 MG/DL (ref 74–106)
HCT VFR BLD CALC: 39.8 % (ref 35.4–49)
HGB BLD-MCNC: 13.4 GM/DL (ref 11.7–16.9)
LYMPHOCYTES # BLD: 24.8 % (ref 8–40)
MAGNESIUM SERPL-MCNC: 2.3 MG/DL (ref 1.8–2.4)
MAGNESIUM SERPL-MCNC: 2.7 MG/DL (ref 1.8–2.4)
MCH RBC QN AUTO: 29.5 PG (ref 25.7–33.7)
MCHC RBC AUTO-ENTMCNC: 33.6 G/DL (ref 32–35.9)
MCV RBC: 87.8 FL (ref 80–96)
MONOCYTES # BLD AUTO: 11.1 % (ref 3.8–10.2)
NEUTROPHILS # BLD: 62.6 % (ref 42.8–82.8)
PLATELET # BLD AUTO: 206 K/MM3 (ref 134–434)
PMV BLD: 8.2 FL (ref 7.5–11.1)
POTASSIUM SERPLBLD-SCNC: 4.1 MMOL/L (ref 3.5–5.1)
PROT SERPL-MCNC: 6.2 G/DL (ref 6.4–8.2)
RBC # BLD AUTO: 4.54 M/MM3 (ref 4–5.6)
SODIUM SERPL-SCNC: 140 MMOL/L (ref 136–145)
WBC # BLD AUTO: 5.5 K/MM3 (ref 4–10)

## 2019-08-15 RX ADMIN — INSULIN ASPART SCH: 100 INJECTION, SOLUTION INTRAVENOUS; SUBCUTANEOUS at 12:36

## 2019-08-15 RX ADMIN — INSULIN ASPART SCH: 100 INJECTION, SOLUTION INTRAVENOUS; SUBCUTANEOUS at 06:36

## 2019-08-15 RX ADMIN — DONEPEZIL HYDROCHLORIDE SCH MG: 10 TABLET, FILM COATED ORAL at 09:57

## 2019-08-15 RX ADMIN — ASPIRIN 81 MG SCH MG: 81 TABLET ORAL at 09:57

## 2019-08-15 RX ADMIN — APIXABAN SCH MG: 2.5 TABLET, FILM COATED ORAL at 09:57

## 2019-08-15 RX ADMIN — DIGOXIN SCH MG: 250 TABLET ORAL at 09:57

## 2019-08-15 RX ADMIN — APIXABAN SCH MG: 2.5 TABLET, FILM COATED ORAL at 23:21

## 2019-08-15 RX ADMIN — MEMANTINE HYDROCHLORIDE SCH MG: 5 TABLET ORAL at 09:57

## 2019-08-15 RX ADMIN — MEMANTINE HYDROCHLORIDE SCH MG: 5 TABLET ORAL at 23:21

## 2019-08-15 RX ADMIN — TAMSULOSIN HYDROCHLORIDE SCH MG: 0.4 CAPSULE ORAL at 09:57

## 2019-08-15 RX ADMIN — INSULIN ASPART SCH: 100 INJECTION, SOLUTION INTRAVENOUS; SUBCUTANEOUS at 16:49

## 2019-08-15 RX ADMIN — ATORVASTATIN CALCIUM SCH MG: 20 TABLET, FILM COATED ORAL at 23:21

## 2019-08-15 RX ADMIN — INSULIN ASPART SCH: 100 INJECTION, SOLUTION INTRAVENOUS; SUBCUTANEOUS at 22:22

## 2019-08-15 NOTE — PN
Progress Note, Physician


Chief Complaint: 





Chief Complaint: sent by Dr Youngblood after seeing in office for c/o SOB & 

palpitations








History of Present Illness: 











85 yo male  with a significant PMH of AF  DM, HTN, BPH, CAD s/p CABG x 5 (2010)

,  who presents to the ED after being seen in PCP office (Dr Youngblood) with 

shortness of breath & palpitations that lasted 30 minutes, according to wife. 

Clay does have VEE & orthopnea sleeping w/ 2 pillows. Baseline leg edema 

that wifes states has been worse over last week.  Patient went to PCP office 

today, and on exam was diaphoretic, pale, & tachycardic. EMS was called for 

transfer to ED.  





- Current Medication List


Current Medications: 


Active Medications





Apixaban (Eliquis -)  2.5 mg PO BID [facility]


   Last Admin: 08/14/19 22:18 Dose:  2.5 mg


Aspirin (Asa -)  81 mg PO DAILY [facility]


Atorvastatin Calcium (Lipitor -)  20 mg PO HS [facility]


Carbidopa/Levodopa (Sinemet *Cr* 50/200 -)  1 combo PO 0700,1200,1700 [facility]


   Last Admin: 08/15/19 06:36 Dose:  1 combo


Digoxin (Lanoxin -)  0.25 mg PO DAILY [facility]


   Last Admin: 08/14/19 22:18 Dose:  0.25 mg


Diltiazem HCl (Cardizem Cd -)  120 mg PO DAILY [facility]


Donepezil HCl (Aricept -)  10 mg PO DAILY [facility]


Insulin Aspart (Novolog Vial Sliding Scale -)  1 vial SQ ACHS [facility]; Protocol


   Last Admin: 08/15/19 06:36 Dose:  Not Given


Memantine (Namenda -)  5 mg PO BID [facility]


   Last Admin: 08/14/19 22:18 Dose:  5 mg


Tamsulosin HCl (Flomax -)  0.4 mg PO DAILY@0830 [facility]


Torsemide (Demadex -)  20 mg PO DAILY [facility]











- Objective


Vital Signs: 


 Vital Signs











Temperature  97.8 F   08/15/19 06:00


 


Pulse Rate  107 H  08/15/19 06:00


 


Respiratory Rate  20   08/15/19 06:00


 


Blood Pressure  121/69   08/15/19 06:00


 


O2 Sat by Pulse Oximetry (%)  96   08/14/19 21:00











Constitutional: Yes: Well Nourished, No Distress, Calm


Eyes: Yes: WNL, Conjunctiva Clear, EOM Intact


HENT: Yes: WNL, Atraumatic, Normocephalic


Neck: Yes: WNL, Supple, Trachea Midline


Cardiovascular: Yes: WNL, Regular Rate and Rhythm, Pulse Irregular


Respiratory: Yes: WNL, Regular, CTA Bilaterally


Gastrointestinal: Yes: WNL, Normal Bowel Sounds, Abdomen, Obese


...Rectal Exam: Yes: Deferred


Genitourinary: Yes: WNL


Breast(s): Yes: WNL


Musculoskeletal: Yes: WNL, Muscle Weakness (genralized weakness)


Extremities: Yes: WNL


Edema: No


Edema: LLE: 1+, RLE: 1+


Peripheral Pulses WNL: Yes


Integumentary: Yes: WNL


Neurological: Yes: Alert, Confusion (periods of confusion @ baseline)


...Motor Strength: LLE, RLE (generalized weakness)


Psychiatric: Yes: Alert, Oriented (to person and place)


Labs: 


 CBC, BMP





 08/15/19 06:28 





 08/15/19 06:28 











Problem List





- Problems


(1) HLD (hyperlipidemia)


Assessment/Plan: 


low fat low cholesterol diet


c/w atorvastin 


Code(s): E78.5 - HYPERLIPIDEMIA, UNSPECIFIED   





(2) Atrial fibrillation with rapid ventricular response


Assessment/Plan: 


AF w/ RVR on CM and EKG


diltaizem IV 15 mg given in ED with decreased in HR


c/w cardizem CD


maintain K>4.0 , Mg >2,0


 Digoxin 0.25 mg added yest, c/w dose  today and then can decrease to 0.125mg 

daily


will do TTE when more rate controlled, recent 4/18 EF 40/45%, no TR, Mild MR, 

LV fx mildly reduced





Code(s): I48.91 - UNSPECIFIED ATRIAL FIBRILLATION   





(3) CHF (congestive heart failure)


Assessment/Plan: 


EF on recent TTE 40-45%


c/w home dose of torsemide


uptitrate as needed given BNP 11K with close attention to Cr (baseline 1.3 in 

2017, now 1.5)


cardiology consultation appreciated


Code(s): I50.9 - HEART FAILURE, UNSPECIFIED   





(4) Gait instability


Assessment/Plan: 


PT request placed


fall precautions


Code(s): R26.81 - UNSTEADINESS ON FEET   





(5) Localized swelling of both lower legs


Assessment/Plan: 


c/w torsemide uptitrating if needed


elevate legs while in bed


Code(s): R22.43 - LOCALIZED SWELLING, MASS AND LUMP, LOWER LIMB, BILATERAL   





(6) Diabetes


Assessment/Plan: 


BGM AC/HS with novolog sliding scale


hold metformin


HgbA1c pending


Code(s): E11.9 - TYPE 2 DIABETES MELLITUS WITHOUT COMPLICATIONS   





(7) HTN (hypertension)


Assessment/Plan: 


low fat low cholesterol diet


normotensive presently


c/w diltiazem


Code(s): I10 - ESSENTIAL (PRIMARY) HYPERTENSION   





(8) Parkinson disease


Assessment/Plan: 


c/w sinemet


PT requested


Code(s): G20 - PARKINSON'S DISEASE   





(9) Dementia


Assessment/Plan: 


c/w namenda & aricept


hold  quetiapine, QTc 578


daily EKG to assess QTc


fall precautions


frequent orientation


encourage family visitation


Code(s): F03.90 - UNSPECIFIED DEMENTIA WITHOUT BEHAVIORAL DISTURBANCE   





(10) Prophylactic measure


Assessment/Plan: 


FEN


no need for additional IVF


cardiac/diabetic diet


monitor electrolytes





DVT


heparin sq bid





Dispo


admit to telemetry


full code


discharge planning


Code(s): Z29.9 - ENCOUNTER FOR PROPHYLACTIC MEASURES, UNSPECIFIED   





Visit type





- Emergency Visit


Emergency Visit: Yes


ED Registration Date: 08/14/19


Care time: The patient presented to the Emergency Department on the above date 

and was hospitalized for further evaluation of their emergent condition.





- New Patient


This patient is new to me today: No





- Critical Care


Critical Care patient: No





- Discharge Referral


Referred to [facility] No

## 2019-08-15 NOTE — CON.CARD
Consult


Consult Specialty:: Cardiology


Referred by:: Deven Tran


Reason for Consultation:: AFL





- History of Present Illness


Chief Complaint: Sent for AFL, diaphoresis


History of Present Illness: 





84 M CAD s/p CABG 2010, mild to mod AR, RBBB, LAI, HTN, HLD, Parkinson's 

Dementia, DM with poor compliance came to office for echo yesterday and was in 

rapid atrial flutter. Meds were adjusted but later in day was in PMD office 

appearing diaphoretic and was sent to ER for further management.





Denies CP, SOB, palps at this time. Appears comfortable.


Recent Holter w/ average rate 100bpm








On tele: AFL 130s, no sig pauses. 





- Past Medical History


CNS: Yes: Parkinson's


Cardio/Vascular: Yes: AFIB, CAD, CHF, HTN





- Past Surgical History


Past Surgical History: Yes: CABG (2010 x5v), Joint Replacement (Left TKR)





- Alcohol/Substance Use


Hx Alcohol Use: No





- Smoking History


Smoking history: Never smoked


Have you smoked in the past 12 months: No





- Social History


ADL: Family Assistance


Occupation: retired


History of Recent Travel: No





Home Medications





- Allergies


Allergies/Adverse Reactions: 


 Allergies











Allergy/AdvReac Type Severity Reaction Status Date / Time


 


No Known Allergies Allergy   Verified 08/14/19 12:55














- Home Medications


Home Medications: 


Ambulatory Orders





Potassium Chloride 20 meq PO DAILY 04/15/15 


Tamsulosin HCl 0.4 mg PO DAILY 04/15/15 


Aspirin 81 mg PO DAILY 03/01/18 


Atorvastatin Ca [Lipitor] 20 mg PO DAILY 03/01/18 


metFORMIN HCL [Metformin HCl] 500 mg PO BID 03/01/18 


Donepezil HCl [Aricept] 10 mg PO DAILY 03/02/18 


Carbidopa/Levodopa *Cr* 50/200 [Sinemet *Cr* 50/200 -] 1 combo PO 0700,1200,

1700 #90 tablet.er 04/19/18 


Apixaban [Eliquis -] 2.5 mg PO BID 08/14/19 


Cholecalciferol (Vitamin D3) [Vitamin D3 -] 50,000 unit PO WEEKLY 08/14/19 


Diltiazem HCl [Cartia Xt] 120 mg PO DAILY 08/14/19 


Memantine HCl [Namenda -] 5 mg PO BID 08/14/19 


Quetiapine Fumarate [Seroquel -] 25 mg PO HS 08/14/19 


Torsemide [Demadex -] 20 mg PO DAILY 08/14/19 











Family Disease History





- Family Disease History


Family History: Unremarkable





Review of Systems


Findings/Remarks: 





see HPI





- Review of Systems


Constitutional: reports: No Symptoms


Cardiovascular: reports: No Symptoms


Respiratory: reports: No Symptoms


Gastrointestinal: reports: No Symptoms


Genitourinary: reports: No Symptoms


Breasts: reports: No Symptoms Reported


Musculoskeletal: reports: No Symptoms


Integumentary: reports: No Symptoms


Neurological: reports: No Symptoms


Endocrine: reports: No Symptoms


Hematology/Lymphatic: reports: No Symptoms


Psychiatric: reports: No Symptoms





- Risk Factors


Known Risk Factors: Yes: Other (Known CAD)


Vital Signs: 


 Vital Signs











Temperature  97.8 F   08/15/19 06:00


 


Pulse Rate  107 H  08/15/19 06:00


 


Respiratory Rate  20   08/15/19 06:00


 


Blood Pressure  121/69   08/15/19 06:00


 


O2 Sat by Pulse Oximetry (%)  96   08/14/19 21:00











Constitutional: Yes: No Distress, Calm


Respiratory: Yes: CTA Bilaterally (no rales or wheezing)


Cardiovascular: Yes: Pulse Irregular


JVD: No


Carotid Bruit: No


Heart Sounds: Yes: S1, S2 (irreg)


Edema: No





- Other Data


Labs, Other Data: 


 CBC, BMP





 08/15/19 06:28 





 08/15/19 06:28 





 Troponin, BNP











  08/14/19 08/14/19 08/15/19





  13:44 13:44 06:28


 


Troponin I  0.03   0.03


 


B-Natriuretic Peptide   30923.8 H  24693.0 H








 Troponin, BNP











  08/14/19 08/14/19 08/15/19





  13:44 13:44 06:28


 


Troponin I  0.03   0.03


 


B-Natriuretic Peptide   71522.8 H  57403.0 H














, RBBB


Echo: Other (office echo prelim yest w/ severe LV dysfx, global. - new from 

prior)





Imaging





- Results


X-ray: Image Reviewed


EKG: Image Reviewed





Assessment/Plan





IMP:


1. 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 

cardiomyopathy


2. CAD s/p CABG (4V 2010: LIMA to LAD, SVG to PDA, SVG to Ramus sequential to OM

)


3.HTN, DM w/ CKD


4. Parkinson's disease








REC:


1. Add Digoxin 0.25 mg yest, today and then can decrease to 0.125mg daily


Used for adjunct rate control as patient with chronic "soft" BP and prior 

adverse reaction to Metoprolol (dizziness) and refuses to take it.


- Continue Cardizem CD; as LV dyfx is suspected to be rate related, reasonable 

to continue it in this setting.





2. Continue Eliquis adjusted for age, weight and renal fx





3. Repeat echo here will likely show similar findings, and should prob be 

repeated after period of rate control.





4. Nuclear stress to r/o ischemia (as cause of decreased EF) when rate 

controlled. 








5. Cont. tele.

## 2019-08-15 NOTE — EKG
Test Reason : 

Blood Pressure : ***/*** mmHG

Vent. Rate : 124 BPM     Atrial Rate : 248 BPM

   P-R Int : 000 ms          QRS Dur : 120 ms

    QT Int : 410 ms       P-R-T Axes : 000 -51 -60 degrees

   QTc Int : 589 ms

 

ATRIAL FLUTTER WITH 2:1 A-V CONDUCTION

LEFT AXIS DEVIATION

RIGHT BUNDLE BRANCH BLOCK

T WAVE ABNORMALITY, CONSIDER INFERIOR ISCHEMIA

ABNORMAL ECG

WHEN COMPARED WITH ECG OF 08-JUL-2019 15:39,

CRITERIA FOR INFERIOR INFARCT ARE NO LONGER PRESENT

NONSPECIFIC T WAVE ABNORMALITY HAS REPLACED INVERTED T WAVES IN 

LATERAL LEADS

Confirmed by BENJAMIN PRITCHETT MD ([id number]) on 8/15/2019 11:12:22 AM

 

Referred By:             Confirmed By:BENJAMIN PRITCHETT MD

## 2019-08-16 LAB
ALBUMIN SERPL-MCNC: 3.2 G/DL (ref 3.4–5)
ALP SERPL-CCNC: 65 U/L (ref 45–117)
ALT SERPL-CCNC: 11 U/L (ref 13–61)
ANION GAP SERPL CALC-SCNC: 9 MMOL/L (ref 8–16)
AST SERPL-CCNC: 28 U/L (ref 15–37)
BASOPHILS # BLD: 0.6 % (ref 0–2)
BILIRUB SERPL-MCNC: 1.5 MG/DL (ref 0.2–1)
BUN SERPL-MCNC: 20.6 MG/DL (ref 7–18)
CALCIUM SERPL-MCNC: 9.1 MG/DL (ref 8.5–10.1)
CHLORIDE SERPL-SCNC: 104 MMOL/L (ref 98–107)
CO2 SERPL-SCNC: 26 MMOL/L (ref 21–32)
CREAT SERPL-MCNC: 1.3 MG/DL (ref 0.55–1.3)
DEPRECATED RDW RBC AUTO: 15 % (ref 11.9–15.9)
EOSINOPHIL # BLD: 1.4 % (ref 0–4.5)
GLUCOSE SERPL-MCNC: 105 MG/DL (ref 74–106)
HCT VFR BLD CALC: 39.7 % (ref 35.4–49)
HGB BLD-MCNC: 13.6 GM/DL (ref 11.7–16.9)
LYMPHOCYTES # BLD: 20.9 % (ref 8–40)
MAGNESIUM SERPL-MCNC: 2.5 MG/DL (ref 1.8–2.4)
MCH RBC QN AUTO: 29.8 PG (ref 25.7–33.7)
MCHC RBC AUTO-ENTMCNC: 34.1 G/DL (ref 32–35.9)
MCV RBC: 87.4 FL (ref 80–96)
MONOCYTES # BLD AUTO: 11.6 % (ref 3.8–10.2)
NEUTROPHILS # BLD: 65.5 % (ref 42.8–82.8)
PLATELET # BLD AUTO: 205 K/MM3 (ref 134–434)
PMV BLD: 8.2 FL (ref 7.5–11.1)
POTASSIUM SERPLBLD-SCNC: 4 MMOL/L (ref 3.5–5.1)
PROT SERPL-MCNC: 6 G/DL (ref 6.4–8.2)
RBC # BLD AUTO: 4.54 M/MM3 (ref 4–5.6)
SODIUM SERPL-SCNC: 139 MMOL/L (ref 136–145)
WBC # BLD AUTO: 4.4 K/MM3 (ref 4–10)

## 2019-08-16 RX ADMIN — DONEPEZIL HYDROCHLORIDE SCH MG: 10 TABLET, FILM COATED ORAL at 09:39

## 2019-08-16 RX ADMIN — MEMANTINE HYDROCHLORIDE SCH MG: 5 TABLET ORAL at 09:33

## 2019-08-16 RX ADMIN — INSULIN ASPART SCH: 100 INJECTION, SOLUTION INTRAVENOUS; SUBCUTANEOUS at 11:31

## 2019-08-16 RX ADMIN — DIGOXIN SCH MG: 125 TABLET ORAL at 21:29

## 2019-08-16 RX ADMIN — INSULIN ASPART SCH: 100 INJECTION, SOLUTION INTRAVENOUS; SUBCUTANEOUS at 06:15

## 2019-08-16 RX ADMIN — ATORVASTATIN CALCIUM SCH MG: 20 TABLET, FILM COATED ORAL at 21:29

## 2019-08-16 RX ADMIN — ASPIRIN 81 MG SCH MG: 81 TABLET ORAL at 09:33

## 2019-08-16 RX ADMIN — TAMSULOSIN HYDROCHLORIDE SCH MG: 0.4 CAPSULE ORAL at 08:47

## 2019-08-16 RX ADMIN — INSULIN ASPART SCH: 100 INJECTION, SOLUTION INTRAVENOUS; SUBCUTANEOUS at 17:03

## 2019-08-16 RX ADMIN — APIXABAN SCH MG: 2.5 TABLET, FILM COATED ORAL at 09:33

## 2019-08-16 RX ADMIN — TORSEMIDE SCH MG: 20 TABLET ORAL at 09:34

## 2019-08-16 RX ADMIN — APIXABAN SCH MG: 2.5 TABLET, FILM COATED ORAL at 21:29

## 2019-08-16 RX ADMIN — INSULIN ASPART SCH: 100 INJECTION, SOLUTION INTRAVENOUS; SUBCUTANEOUS at 21:30

## 2019-08-16 RX ADMIN — MEMANTINE HYDROCHLORIDE SCH MG: 5 TABLET ORAL at 21:29

## 2019-08-16 NOTE — EKG
Test Reason : 

Blood Pressure : ***/*** mmHG

Vent. Rate : 123 BPM     Atrial Rate : 246 BPM

   P-R Int : 000 ms          QRS Dur : 156 ms

    QT Int : 362 ms       P-R-T Axes : 112 -65 -41 degrees

   QTc Int : 518 ms

 

ATRIAL FLUTTER WITH 2:1 A-V CONDUCTION

RIGHT BUNDLE BRANCH BLOCK

LEFT ANTERIOR FASCICULAR BLOCK

*** BIFASCICULAR BLOCK ***

ABNORMAL ECG

WHEN COMPARED WITH ECG OF 14-AUG-2019 13:11,

NO SIGNIFICANT CHANGE WAS FOUND

Confirmed by BENJAMIN PRITCHETT MD ([id number]) on 8/16/2019 11:52:59 AM

 

Referred By:             Confirmed By:BENJAMIN PRITCHETT MD

## 2019-08-16 NOTE — PN
Progress Note, Physician


Chief Complaint: 





Chief Complaint: sent by Dr Youngblood after seeing in office for c/o SOB & 

palpitations








History of Present Illness: 











83 yo male  with a significant PMH of AF  DM, HTN, BPH, CAD s/p CABG x 5 (2010)

,  who presents to the ED after being seen in PCP office (Dr Youngblood) with 

shortness of breath & palpitations that lasted 30 minutes, according to wife. 

Clay does have VEE & orthopnea sleeping w/ 2 pillows. Baseline leg edema 

that wifes states has been worse over last week.  Patient went to PCP office 

today, and on exam was diaphoretic, pale, & tachycardic. EMS was called for 

transfer to ED.  





- Current Medication List


Current Medications: 


Active Medications





Apixaban (Eliquis -)  2.5 mg PO BID [facility]


   Last Admin: 08/15/19 23:21 Dose:  2.5 mg


Aspirin (Asa -)  81 mg PO DAILY [facility]


   Last Admin: 08/15/19 09:57 Dose:  81 mg


Atorvastatin Calcium (Lipitor -)  20 mg PO HS [facility]


   Last Admin: 08/15/19 23:21 Dose:  20 mg


Carbidopa/Levodopa (Sinemet *Cr* 50/200 -)  1 combo PO 0700,1200,1700 [facility]


   Last Admin: 08/16/19 06:31 Dose:  1 combo


Digoxin (Lanoxin -)  0.125 mg PO DAILY [facility]


Diltiazem HCl (Cardizem Cd -)  120 mg PO DAILY [facility]


   Last Admin: 08/15/19 09:57 Dose:  120 mg


Donepezil HCl (Aricept -)  10 mg PO DAILY [facility]


   Last Admin: 08/15/19 09:57 Dose:  10 mg


Insulin Aspart (Novolog Vial Sliding Scale -)  1 vial SQ ACHS [facility]; Protocol


   Last Admin: 08/16/19 06:15 Dose:  Not Given


Memantine (Namenda -)  5 mg PO BID [facility]


   Last Admin: 08/15/19 23:21 Dose:  5 mg


Tamsulosin HCl (Flomax -)  0.4 mg PO DAILY@0830 [facility]


   Last Admin: 08/16/19 08:47 Dose:  0.4 mg


Torsemide (Demadex -)  40 mg PO DAILY [facility]











- Objective


Vital Signs: 


 Vital Signs











Temperature  97.8 F   08/16/19 02:00


 


Pulse Rate  88   08/16/19 06:00


 


Respiratory Rate  18   08/16/19 06:00


 


Blood Pressure  124/81   08/16/19 06:00


 


O2 Sat by Pulse Oximetry (%)  92 L  08/15/19 21:00











Constitutional: Yes: Well Nourished, No Distress, Calm


Eyes: Yes: WNL, Conjunctiva Clear, EOM Intact


HENT: Yes: WNL, Atraumatic, Normocephalic


Neck: Yes: WNL, Supple, Trachea Midline


Cardiovascular: Yes: WNL, Regular Rate and Rhythm


Respiratory: Yes: Regular, Rales


Gastrointestinal: Yes: WNL, Normal Bowel Sounds, Soft


...Rectal Exam: Yes: Deferred


Genitourinary: Yes: WNL


Breast(s): Yes: WNL


Musculoskeletal: Yes: WNL


Extremities: Yes: WNL


Edema: Yes


Edema: LLE: Trace, RLE: Trace


Integumentary: Yes: WNL


Neurological: Yes: WNL, Alert, Confusion


...Motor Strength: LLE, RLE (generalized)


Psychiatric: Yes: WNL, Alert, Oriented


Labs: 


 CBC, BMP





 08/16/19 06:28 





 08/16/19 06:28 











Problem List





- Problems


(1) HLD (hyperlipidemia)


Assessment/Plan: 


low fat low cholesterol diet


c/w atorvastin 


Code(s): E78.5 - HYPERLIPIDEMIA, UNSPECIFIED   





(2) Atrial fibrillation with rapid ventricular response


Assessment/Plan: 


AF w/ RVR on CM and EKG


diltaizem IV 15 mg given in ED with decreased in HR


c/w cardizem CD


maintain K>4.0 , Mg >2,0


 c/w Digoxin rate controlled now


will do TTE when more rate controlled, recent 4/18 EF 40/45%, no TR, Mild MR, 

LV fx mildly reduced





Code(s): I48.91 - UNSPECIFIED ATRIAL FIBRILLATION   





(3) CHF (congestive heart failure)


Assessment/Plan: 


EF on recent TTE 40-45%


c/w home dose of torsemide


uptitrate as needed given BNP 11K with close attention to Cr (baseline 1.3 in 

2017, now 1.5)


cardiology consultation appreciated


Code(s): I50.9 - HEART FAILURE, UNSPECIFIED   





(4) Gait instability


Assessment/Plan: 


PT request placed


fall precautions


Code(s): R26.81 - UNSTEADINESS ON FEET   





(5) Localized swelling of both lower legs


Assessment/Plan: 


c/w torsemide increased to 40mg


elevate legs while in bed


Code(s): R22.43 - LOCALIZED SWELLING, MASS AND LUMP, LOWER LIMB, BILATERAL   





(6) Diabetes


Assessment/Plan: 


BGM AC/HS with novolog sliding scale


hold metformin


HgbA1c pending


Code(s): E11.9 - TYPE 2 DIABETES MELLITUS WITHOUT COMPLICATIONS   





(7) HTN (hypertension)


Assessment/Plan: 


low fat low cholesterol diet


normotensive presently


c/w diltiazem


Code(s): I10 - ESSENTIAL (PRIMARY) HYPERTENSION   





(8) Parkinson disease


Assessment/Plan: 


c/w sinemet


PT requested


Code(s): G20 - PARKINSON'S DISEASE   





(9) Dementia


Assessment/Plan: 


c/w namenda & aricept


hold  quetiapine, QTc 578


daily EKG to assess QTc


fall precautions


frequent orientation


encourage family visitation


Code(s): F03.90 - UNSPECIFIED DEMENTIA WITHOUT BEHAVIORAL DISTURBANCE   





(10) Prophylactic measure


Assessment/Plan: 


FEN


no need for additional IVF


cardiac/diabetic diet


monitor electrolytes





DVT


heparin sq bid





Dispo


admit to telemetry


full code


discharge planning


Code(s): Z29.9 - ENCOUNTER FOR PROPHYLACTIC MEASURES, UNSPECIFIED   





Visit type





- Emergency Visit


Emergency Visit: Yes


ED Registration Date: 08/14/19


Care time: The patient presented to the Emergency Department on the above date 

and was hospitalized for further evaluation of their emergent condition.





- New Patient


This patient is new to me today: No





- Critical Care


Critical Care patient: No





- Discharge Referral


Referred to [facility] No

## 2019-08-16 NOTE — EKG
Test Reason : 

Blood Pressure : ***/*** mmHG

Vent. Rate : 116 BPM     Atrial Rate : 250 BPM

   P-R Int : 000 ms          QRS Dur : 158 ms

    QT Int : 374 ms       P-R-T Axes : 149 -41 -30 degrees

   QTc Int : 519 ms

 

ATRIAL FLUTTER WITH VARIABLE A-V BLOCK WITH PREMATURE VENTRICULAR OR

ABERRANTLY CONDUCTED COMPLEXES

LEFT AXIS DEVIATION

RIGHT BUNDLE BRANCH BLOCK

INFERIOR INFARCT , AGE UNDETERMINED

ABNORMAL ECG

WHEN COMPARED WITH ECG OF 14-AUG-2019 15:33,

NO SIGNIFICANT CHANGE WAS FOUND

Confirmed by BENJAMIN PRITCHETT MD ([id number]) on 8/16/2019 11:48:47 AM

 

Referred By:             Confirmed By:BENJAMIN PRITCHETT MD

## 2019-08-16 NOTE — PN
Progress Note, Physician


Chief Complaint: 





sitting on edge bed, no distress





TELE: AF, 80s. PVC, couplets. 


History of Present Illness: 





weight stable. 





- Current Medication List


Current Medications: 


Active Medications





Apixaban (Eliquis -)  2.5 mg PO BID [facility]


   Last Admin: 08/15/19 23:21 Dose:  2.5 mg


Aspirin (Asa -)  81 mg PO DAILY [facility]


   Last Admin: 08/15/19 09:57 Dose:  81 mg


Atorvastatin Calcium (Lipitor -)  20 mg PO HS [facility]


   Last Admin: 08/15/19 23:21 Dose:  20 mg


Carbidopa/Levodopa (Sinemet *Cr* 50/200 -)  1 combo PO 0700,1200,1700 [facility]


   Last Admin: 08/16/19 06:31 Dose:  1 combo


Digoxin (Lanoxin -)  0.125 mg PO DAILY [facility]


Diltiazem HCl (Cardizem Cd -)  120 mg PO DAILY [facility]


   Last Admin: 08/15/19 09:57 Dose:  120 mg


Donepezil HCl (Aricept -)  10 mg PO DAILY [facility]


   Last Admin: 08/15/19 09:57 Dose:  10 mg


Insulin Aspart (Novolog Vial Sliding Scale -)  1 vial SQ ACHS [facility]; Protocol


   Last Admin: 08/16/19 06:15 Dose:  Not Given


Memantine (Namenda -)  5 mg PO BID [facility]


   Last Admin: 08/15/19 23:21 Dose:  5 mg


Tamsulosin HCl (Flomax -)  0.4 mg PO DAILY@0830 [facility]


   Last Admin: 08/16/19 08:47 Dose:  0.4 mg


Torsemide (Demadex -)  20 mg PO DAILY [facility]


   Last Admin: 08/15/19 09:57 Dose:  20 mg











- Objective


Vital Signs: 


 Vital Signs











Temperature  97.8 F   08/16/19 02:00


 


Pulse Rate  88   08/16/19 06:00


 


Respiratory Rate  18   08/16/19 06:00


 


Blood Pressure  124/81   08/16/19 06:00


 


O2 Sat by Pulse Oximetry (%)  92 L  08/15/19 21:00











Constitutional: Yes: No Distress


Cardiovascular: Yes: Pulse Irregular


Gastrointestinal: Yes: Other (rales right base)


Edema: No


Neurological: Yes: Alert, Oriented


Labs: 


 CBC, BMP





 08/16/19 06:28 





 08/16/19 06:28 











- ....Imaging


EKG: Image Reviewed





Assessment/Plan





IMP:


1. 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 

cardiomyopathy


2. CAD s/p CABG (4V 2010: LIMA to LAD, SVG to PDA, SVG to Ramus sequential to OM

)


3.HTN, DM w/ CKD


4. Parkinson's disease








REC:


1. Rates improved with addition of Dig


Used for adjunct rate control as patient with chronic "soft" BP and prior 

adverse reaction to Metoprolol (dizziness) and refuses to take it.


- Continue Cardizem CD; as LV dyfx is suspected to be rate related, reasonable 

to continue it in this setting.





2. Continue Eliquis adjusted for age, weight and renal fx





3. Echo yest showed similar findings- new severe LV dysfx (suspect rate related

) and should prob be repeated after period of rate control.





4. Nuclear stress to r/o ischemia (as cause of decreased EF) when rate 

controlled and euvolemic, rales on exam today. Will increase Torsemide. Cont 

daily weights and BMP to follow renal function. 








5. Cont. tele.

## 2019-08-17 LAB
ALBUMIN SERPL-MCNC: 3.3 G/DL (ref 3.4–5)
ALP SERPL-CCNC: 66 U/L (ref 45–117)
ALT SERPL-CCNC: 15 U/L (ref 13–61)
ANION GAP SERPL CALC-SCNC: 8 MMOL/L (ref 8–16)
AST SERPL-CCNC: 28 U/L (ref 15–37)
BASOPHILS # BLD: 0.7 % (ref 0–2)
BILIRUB SERPL-MCNC: 1.2 MG/DL (ref 0.2–1)
BUN SERPL-MCNC: 19.6 MG/DL (ref 7–18)
CALCIUM SERPL-MCNC: 8.9 MG/DL (ref 8.5–10.1)
CHLORIDE SERPL-SCNC: 103 MMOL/L (ref 98–107)
CO2 SERPL-SCNC: 30 MMOL/L (ref 21–32)
CREAT SERPL-MCNC: 1.3 MG/DL (ref 0.55–1.3)
DEPRECATED RDW RBC AUTO: 15.3 % (ref 11.9–15.9)
EOSINOPHIL # BLD: 1.2 % (ref 0–4.5)
GLUCOSE SERPL-MCNC: 100 MG/DL (ref 74–106)
HCT VFR BLD CALC: 40.6 % (ref 35.4–49)
HGB BLD-MCNC: 13.6 GM/DL (ref 11.7–16.9)
LYMPHOCYTES # BLD: 24.8 % (ref 8–40)
MAGNESIUM SERPL-MCNC: 2.4 MG/DL (ref 1.8–2.4)
MCH RBC QN AUTO: 29.6 PG (ref 25.7–33.7)
MCHC RBC AUTO-ENTMCNC: 33.6 G/DL (ref 32–35.9)
MCV RBC: 88.3 FL (ref 80–96)
MONOCYTES # BLD AUTO: 11.5 % (ref 3.8–10.2)
NEUTROPHILS # BLD: 61.8 % (ref 42.8–82.8)
PLATELET # BLD AUTO: 206 K/MM3 (ref 134–434)
PMV BLD: 8.1 FL (ref 7.5–11.1)
POTASSIUM SERPLBLD-SCNC: 4 MMOL/L (ref 3.5–5.1)
PROT SERPL-MCNC: 6.3 G/DL (ref 6.4–8.2)
RBC # BLD AUTO: 4.6 M/MM3 (ref 4–5.6)
SODIUM SERPL-SCNC: 141 MMOL/L (ref 136–145)
WBC # BLD AUTO: 3.7 K/MM3 (ref 4–10)

## 2019-08-17 RX ADMIN — APIXABAN SCH MG: 2.5 TABLET, FILM COATED ORAL at 21:17

## 2019-08-17 RX ADMIN — DONEPEZIL HYDROCHLORIDE SCH MG: 10 TABLET, FILM COATED ORAL at 10:11

## 2019-08-17 RX ADMIN — APIXABAN SCH MG: 2.5 TABLET, FILM COATED ORAL at 10:11

## 2019-08-17 RX ADMIN — INSULIN ASPART SCH: 100 INJECTION, SOLUTION INTRAVENOUS; SUBCUTANEOUS at 16:55

## 2019-08-17 RX ADMIN — TORSEMIDE SCH MG: 20 TABLET ORAL at 10:11

## 2019-08-17 RX ADMIN — TAMSULOSIN HYDROCHLORIDE SCH MG: 0.4 CAPSULE ORAL at 10:11

## 2019-08-17 RX ADMIN — INSULIN ASPART SCH: 100 INJECTION, SOLUTION INTRAVENOUS; SUBCUTANEOUS at 21:17

## 2019-08-17 RX ADMIN — DIGOXIN SCH MG: 125 TABLET ORAL at 10:11

## 2019-08-17 RX ADMIN — INSULIN ASPART SCH: 100 INJECTION, SOLUTION INTRAVENOUS; SUBCUTANEOUS at 06:41

## 2019-08-17 RX ADMIN — MEMANTINE HYDROCHLORIDE SCH MG: 5 TABLET ORAL at 10:11

## 2019-08-17 RX ADMIN — INSULIN ASPART SCH: 100 INJECTION, SOLUTION INTRAVENOUS; SUBCUTANEOUS at 12:27

## 2019-08-17 RX ADMIN — ATORVASTATIN CALCIUM SCH MG: 20 TABLET, FILM COATED ORAL at 21:17

## 2019-08-17 RX ADMIN — MEMANTINE HYDROCHLORIDE SCH MG: 5 TABLET ORAL at 21:17

## 2019-08-17 RX ADMIN — ASPIRIN 81 MG SCH MG: 81 TABLET ORAL at 10:11

## 2019-08-17 NOTE — PN
Progress Note, Physician


Chief Complaint: 





tachycardia


History of Present Illness: 





denies palpitations.


says he walked in halls without dizzy, sob, cp





- Current Medication List


Current Medications: 


Active Medications





Apixaban (Eliquis -)  2.5 mg PO BID [facility]


   Last Admin: 08/16/19 21:29 Dose:  2.5 mg


Aspirin (Asa -)  81 mg PO DAILY [facility]


   Last Admin: 08/16/19 09:33 Dose:  81 mg


Atorvastatin Calcium (Lipitor -)  20 mg PO HS [facility]


   Last Admin: 08/16/19 21:29 Dose:  20 mg


Carbidopa/Levodopa (Sinemet *Cr* 50/200 -)  1 combo PO 0700,1200,1700 [facility]


   Last Admin: 08/17/19 06:43 Dose:  1 combo


Digoxin (Lanoxin -)  0.125 mg PO DAILY [facility]


   Last Admin: 08/16/19 21:29 Dose:  0.125 mg


Diltiazem HCl (Cardizem Cd -)  120 mg PO DAILY [facility]


   Last Admin: 08/16/19 09:33 Dose:  120 mg


Donepezil HCl (Aricept -)  10 mg PO DAILY [facility]


   Last Admin: 08/16/19 09:39 Dose:  10 mg


Insulin Aspart (Novolog Vial Sliding Scale -)  1 vial SQ ACHS [facility]; Protocol


   Last Admin: 08/17/19 06:41 Dose:  Not Given


Memantine (Namenda -)  5 mg PO BID [facility]


   Last Admin: 08/16/19 21:29 Dose:  5 mg


Tamsulosin HCl (Flomax -)  0.4 mg PO DAILY@0830 [facility]


   Last Admin: 08/16/19 08:47 Dose:  0.4 mg


Torsemide (Demadex -)  40 mg PO DAILY [facility]


   Last Admin: 08/16/19 09:34 Dose:  40 mg











- Objective


Vital Signs: 


 Vital Signs











Temperature  98.4 F   08/17/19 02:00


 


Pulse Rate  85   08/17/19 06:00


 


Respiratory Rate  20   08/17/19 06:00


 


Blood Pressure  120/73   08/17/19 06:00


 


O2 Sat by Pulse Oximetry (%)  93 L  08/16/19 20:15











Constitutional: Yes: Well Nourished, No Distress, Calm


Cardiovascular: Yes: Pulse Irregular, S1, S2.  No: Gallop, Murmur


Respiratory: Yes: Regular, CTA Bilaterally.  No: Accessory Muscle Use, Rales, 

Wheezes


Extremities: No: Cold


Edema: No


Neurological: Yes: Alert, Oriented


Psychiatric: No: Agitated


Labs: 


 CBC, BMP





 08/17/19 06:34 





 08/17/19 06:34 











Assessment/Plan





tele: AFL rates 80s-120-->30s-40s overnight likely asleep with hi vagal tone. 

at times aberrant conduction.








IMP:


1. 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 

cardiomyopathy


2. severe LV systolic dysfuntion, new (on office echo 8/19)


3. CAD s/p CABG (4V 2010: LIMA to LAD, SVG to PDA, SVG to Ramus sequential to OM

)


4.HTN, DM w/ CKD


5. Parkinson's disease








REC:


-Rates improved with addition of Dig (used for adjunct rate control as patient 

with chronic "soft" BP and prior adverse reaction to Metoprolol (dizziness) and 

refuses to take it.


-Continue Cardizem CD; as LV dyfx is suspected to be rate related, should 

improved with rate control--reasonable to continue it in this setting.


-digoxin level in AM


-ambulated in halls today to assess for adequacy of rate control


-if rate control cannot be obtained medically, will have to consider flutter 

ablation


-Continue Eliquis adjusted for age, weight and renal fx


-Echo in office with new severe LV dysfx (suspect rate related)--to be repeated 

after period of rate control.


-torsemide increased 8/16 for rales on exam--lungs clear 8/17, appears 

euvolemic. continue same


-Nuclear stress monday (as cause of decreased EF)

## 2019-08-17 NOTE — PN
Progress Note, Physician


Chief Complaint: 





Sent by Dr Youngblood after seeing in office for c/o SOB & palpitations. No 

complaints offered overnight








History of Present Illness: 











85 yo male  with a significant PMH of AF  DM, HTN, BPH, CAD s/p CABG x 5 (2010)

,  who presents to the ED after being seen in PCP office (Dr Youngblood) with 

shortness of breath & palpitations that lasted 30 minutes, according to wife. 

Clay does have VEE & orthopnea sleeping w/ 2 pillows. Baseline leg edema 

that wifes states has been worse over last week.  Patient went to PCP office 

today, and on exam was diaphoretic, pale, & tachycardic. EMS was called for 

transfer to ED.  





- Current Medication List


Current Medications: 


Active Medications





Apixaban (Eliquis -)  2.5 mg PO BID [facility]


   Last Admin: 08/16/19 21:29 Dose:  2.5 mg


Aspirin (Asa -)  81 mg PO DAILY [facility]


   Last Admin: 08/16/19 09:33 Dose:  81 mg


Atorvastatin Calcium (Lipitor -)  20 mg PO HS [facility]


   Last Admin: 08/16/19 21:29 Dose:  20 mg


Carbidopa/Levodopa (Sinemet *Cr* 50/200 -)  1 combo PO 0700,1200,1700 [facility]


   Last Admin: 08/17/19 06:43 Dose:  1 combo


Digoxin (Lanoxin -)  0.125 mg PO DAILY [facility]


   Last Admin: 08/16/19 21:29 Dose:  0.125 mg


Diltiazem HCl (Cardizem Cd -)  120 mg PO DAILY [facility]


   Last Admin: 08/16/19 09:33 Dose:  120 mg


Donepezil HCl (Aricept -)  10 mg PO DAILY [facility]


   Last Admin: 08/16/19 09:39 Dose:  10 mg


Insulin Aspart (Novolog Vial Sliding Scale -)  [facility]; Protocol


   Last Admin: 08/17/19 06:41 Dose:  Not Given


Memantine (Namenda -)  5 mg PO BID [facility]


   Last Admin: 08/16/19 21:29 Dose:  5 mg


Tamsulosin HCl (Flomax -)  0.4 mg PO DAILY@0830 [facility]


   Last Admin: 08/16/19 08:47 Dose:  0.4 mg


Torsemide (Demadex -)  40 mg PO DAILY [facility]


   Last Admin: 08/16/19 09:34 Dose:  40 mg











- Objective


Vital Signs: 


 Vital Signs











Temperature  98.4 F   08/17/19 02:00


 


Pulse Rate  85   08/17/19 06:00


 


Respiratory Rate  20   08/17/19 06:00


 


Blood Pressure  120/73   08/17/19 06:00


 


O2 Sat by Pulse Oximetry (%)  93 L  08/16/19 20:15











Constitutional: Yes: Well Nourished, No Distress, Calm


Eyes: Yes: WNL, Conjunctiva Clear, EOM Intact


HENT: Yes: WNL, Atraumatic, Normocephalic


Neck: Yes: WNL, Supple, Trachea Midline


Cardiovascular: Yes: WNL, Pulse Irregular (Aflutter on cm)


Respiratory: Yes: WNL, Regular, CTA Bilaterally


Gastrointestinal: Yes: WNL, Normal Bowel Sounds, Soft


...Rectal Exam: Yes: Deferred


Genitourinary: Yes: WNL


Breast(s): Yes: WNL


Musculoskeletal: Yes: WNL


Extremities: Yes: WNL


Edema: No


Peripheral Pulses WNL: Yes


Integumentary: Yes: WNL


Neurological: Yes: WNL, Alert, Confusion (at times)


...Motor Strength: LLE, RLE (generalized weakness)


Psychiatric: Yes: WNL, Alert, Oriented (to person and place)


Labs: 


 CBC, BMP





 08/17/19 06:34 











Problem List





- Problems


(1) HLD (hyperlipidemia)


Assessment/Plan: 


low fat low cholesterol diet


c/w atorvastin 


Code(s): E78.5 - HYPERLIPIDEMIA, UNSPECIFIED   





(2) Atrial fibrillation with rapid ventricular response


Assessment/Plan: 


AF w/ RVR on CM and EKG on admission , now in aflutter rate controlled


c/w cardizem CD


 c/w Digoxin rate controlled now


maintain K>4.0 , Mg >2,0


TTE/ST planned for monday 





Code(s): I48.91 - UNSPECIFIED ATRIAL FIBRILLATION   





(3) CHF (congestive heart failure)


Assessment/Plan: 


EF on recent TTE 40-45%


c/w increased dose of torsemide


cardiology consultation appreciated


Code(s): I50.9 - HEART FAILURE, UNSPECIFIED   





(4) Gait instability


Assessment/Plan: 


PT request placed


fall precautions


Code(s): R26.81 - UNSTEADINESS ON FEET   





(5) Localized swelling of both lower legs


Assessment/Plan: 


c/w torsemide 





Code(s): R22.43 - LOCALIZED SWELLING, MASS AND LUMP, LOWER LIMB, BILATERAL   





(6) Diabetes


Assessment/Plan: 


BGM AC/HS with novolog sliding scale


hold metformin


will send HgbA1c


Code(s): E11.9 - TYPE 2 DIABETES MELLITUS WITHOUT COMPLICATIONS   





(7) HTN (hypertension)


Assessment/Plan: 


low fat low cholesterol diet


normotensive presently


c/w diltiazem


Code(s): I10 - ESSENTIAL (PRIMARY) HYPERTENSION   





(8) Parkinson disease


Assessment/Plan: 


c/w sinemet


PT requested


Code(s): G20 - PARKINSON'S DISEASE   





(9) Dementia


Assessment/Plan: 


c/w namenda & aricept


hold  quetiapine, QTc 578


daily EKG to assess QTc


fall precautions


frequent orientation


encourage family visitation


Code(s): F03.90 - UNSPECIFIED DEMENTIA WITHOUT BEHAVIORAL DISTURBANCE   





(10) Prophylactic measure


Assessment/Plan: 


FEN


no need for additional IVF


cardiac/diabetic diet


monitor electrolytes





DVT


heparin sq bid





Dispo


maintain on telemetry


full code


discharge planning


Code(s): Z29.9 - ENCOUNTER FOR PROPHYLACTIC MEASURES, UNSPECIFIED   





Visit type





- Emergency Visit


Emergency Visit: Yes


ED Registration Date: 08/14/19


Care time: The patient presented to the Emergency Department on the above date 

and was hospitalized for further evaluation of their emergent condition.





- New Patient


This patient is new to me today: No





- Critical Care


Critical Care patient: No





- Discharge Referral


Referred to [facility] No

## 2019-08-18 RX ADMIN — DIGOXIN SCH MG: 125 TABLET ORAL at 10:38

## 2019-08-18 RX ADMIN — MEMANTINE HYDROCHLORIDE SCH MG: 5 TABLET ORAL at 10:37

## 2019-08-18 RX ADMIN — APIXABAN SCH MG: 2.5 TABLET, FILM COATED ORAL at 21:22

## 2019-08-18 RX ADMIN — INSULIN ASPART SCH: 100 INJECTION, SOLUTION INTRAVENOUS; SUBCUTANEOUS at 06:23

## 2019-08-18 RX ADMIN — APIXABAN SCH MG: 2.5 TABLET, FILM COATED ORAL at 10:38

## 2019-08-18 RX ADMIN — INSULIN ASPART SCH: 100 INJECTION, SOLUTION INTRAVENOUS; SUBCUTANEOUS at 21:28

## 2019-08-18 RX ADMIN — INSULIN ASPART SCH: 100 INJECTION, SOLUTION INTRAVENOUS; SUBCUTANEOUS at 18:12

## 2019-08-18 RX ADMIN — INSULIN ASPART SCH: 100 INJECTION, SOLUTION INTRAVENOUS; SUBCUTANEOUS at 11:30

## 2019-08-18 RX ADMIN — ATORVASTATIN CALCIUM SCH MG: 20 TABLET, FILM COATED ORAL at 21:22

## 2019-08-18 RX ADMIN — TORSEMIDE SCH MG: 20 TABLET ORAL at 10:38

## 2019-08-18 RX ADMIN — ASPIRIN 81 MG SCH MG: 81 TABLET ORAL at 10:38

## 2019-08-18 RX ADMIN — MEMANTINE HYDROCHLORIDE SCH MG: 5 TABLET ORAL at 21:22

## 2019-08-18 RX ADMIN — DONEPEZIL HYDROCHLORIDE SCH MG: 10 TABLET, FILM COATED ORAL at 10:38

## 2019-08-18 RX ADMIN — TAMSULOSIN HYDROCHLORIDE SCH MG: 0.4 CAPSULE ORAL at 10:38

## 2019-08-18 NOTE — PN
Physical Exam: 


SUBJECTIVE: Patient seen and examined





pt is better and has no symptoms of sob, cp or palpitations


seen by cardio  getting


no distress 


no fever or chills.








[place]


85 yo male  with a significant PMH of AF  DM, HTN, BPH, CAD s/p CABG x 5 (2010)

,  who presents to the ED after being seen in PCP office (Dr Youngblood) with 

shortness of breath & palpitations that lasted 30 minutes, according to wife. 

Patinet does have VEE & orthopnea sleeping w/ 2 pillows. Baseline leg edema 

that wifes states has been worse over last week.  Patient went to PCP office 

today, and on exam was diaphoretic, pale, & tachycardic. EMS was called for 

transfer to ED. 





OBJECTIVE:





 Vital Signs











 Period  Temp  Pulse  Resp  BP Sys/Lowe  Pulse Ox


 


 Last 24 Hr  97.5 F-98.4 F    18-20  100-134/50-85  93-96











GENERAL: The patient is awake, alert, and fully oriented, in no acute distress.


HEAD: Normal with no signs of trauma.


EYES: PERRL, extraocular movements intact, sclera anicteric, conjunctiva clear. 

No ptosis. 


ENT: Ears normal, nares patent, oropharynx clear without exudates, moist mucous 


membranes.


NECK:  supple. 


LUNGS: Breath sounds equal, clear to auscultation bilaterally, no wheezes, no 

crackles, no 


accessory muscle use. 


HEART: irregular  rate and rhythm, S1, S2 without murmur, rub or gallop.


ABDOMEN: Soft, nontender, nondistended, normoactive bowel sounds, no guarding, 

no 


rebound, no hepatosplenomegaly, no masses.


EXTREMITIES: 2+ pulses, warm, well-perfused, no edema. 


NEUROLOGICAL: alert and awake and oriented 











 Laboratory Results - last 24 hr











  08/17/19 08/17/19 08/18/19





  16:24 21:16 05:38


 


POC Glucometer  132  132  117


 


Digoxin   














  08/18/19





  06:49


 


POC Glucometer 


 


Digoxin  0.80








Active Medications











Generic Name Dose Route Start Last Admin





  Trade Name Freq  PRN Reason Stop Dose Admin


 


Apixaban  2.5 mg  08/14/19 22:00  08/18/19 10:38





  Eliquis -  PO   2.5 mg





  BID MITCHELL   Administration





     





     





     





     


 


Aspirin  81 mg  08/15/19 10:00  08/18/19 10:38





  Asa -  PO   81 mg





  DAILY MITCHELL   Administration





     





     





     





     


 


Atorvastatin Calcium  20 mg  08/15/19 22:00  08/17/19 21:17





  Lipitor -  PO   20 mg





  HS [facility]LL   Administration





     





     





     





     


 


Carbidopa/Levodopa  1 combo  08/15/19 07:00  08/18/19 12:31





  Sinemet *Cr* 50/200 -  PO   1 combo





  0700,1200,1700 [facility]   Administration





     





     





     





     


 


Digoxin  0.125 mg  08/16/19 20:00  08/18/19 10:38





  Lanoxin -  PO   0.125 mg





  DAILY [facility]CHELL   Administration





     





     





     





     


 


Diltiazem HCl  120 mg  08/15/19 10:00  08/18/19 10:37





  Cardizem Cd -  PO   120 mg





  DAILY [facility]LL   Administration





     





     





     





     


 


Donepezil HCl  10 mg  08/15/19 10:00  08/18/19 10:38





  Aricept -  PO   10 mg





  DAILY [facility]LL   Administration





     





     





     





     


 


Insulin Aspart  1 vial  08/14/19 22:00  08/18/19 11:30





  Novolog Vial Sliding Scale -  SQ   Not Given





  [facility]   





     





     





  Protocol   





     


 


Memantine  5 mg  08/14/19 22:00  08/18/19 10:37





  Namenda -  PO   5 mg





  BID [facility]   Administration





     





     





     





     


 


Tamsulosin HCl  0.4 mg  08/15/19 08:30  08/18/19 10:38





  Flomax -  PO   0.4 mg





  DAILY@0830 [facility]   Administration





     





     





     





     


 


Torsemide  40 mg  08/16/19 10:00  08/18/19 10:38





  Demadex -  PO   40 mg





  DAILY [facility]   Administration





     





     





     





     











ASSESSMENT/PLAN:


1  Atrial fibrillation ./ aflutter 


  his heart rate is controlled 


  He is on eliquis,digoxin and cardizem 


2-cad


   on aspirin and atorvastatin and he is pain free


3- parkinsonism


    stable continue the sinemet 


4-dm


  he is stable and on sliding scale.


5-bph 


  no symptoms , continue flomax 


6 - dementia 


  stable and continue aricept .





Visit type





- Emergency Visit


Emergency Visit: Yes


ED Registration Date: 08/14/19


Care time: The patient presented to the Emergency Department on the above date 

and was hospitalized for further evaluation of their emergent condition.





- New Patient


This patient is new to me today: Yes


Date on this admission: 08/18/19





- Critical Care


Critical Care patient: No





- Discharge Referral


Referred to [facility] No

## 2019-08-18 NOTE — PN
Progress Note, Physician


Chief Complaint: 





tachycardia


History of Present Illness: 





he denies sob or palpitations, including when walked in [place] yesterday.





no cp, leg swelling





- Current Medication List


Current Medications: 


Active Medications





Apixaban (Eliquis -)  2.5 mg PO BID [facility]


   Last Admin: 08/18/19 10:38 Dose:  2.5 mg


Aspirin (Asa -)  81 mg PO DAILY [facility]


   Last Admin: 08/18/19 10:38 Dose:  81 mg


Atorvastatin Calcium (Lipitor -)  20 mg PO HS [facility]


   Last Admin: 08/17/19 21:17 Dose:  20 mg


Carbidopa/Levodopa (Sinemet *Cr* 50/200 -)  1 combo PO 0700,1200,1700 [facility]


   Last Admin: 08/18/19 06:23 Dose:  1 combo


Digoxin (Lanoxin -)  0.125 mg PO DAILY [facility]


   Last Admin: 08/18/19 10:38 Dose:  0.125 mg


Diltiazem HCl (Cardizem Cd -)  120 mg PO DAILY [facility]


   Last Admin: 08/18/19 10:37 Dose:  120 mg


Donepezil HCl (Aricept -)  10 mg PO DAILY [facility]


   Last Admin: 08/18/19 10:38 Dose:  10 mg


Insulin Aspart (Novolog Vial Sliding Scale -)  1 vial SQ ACHS [facility]; Protocol


   Last Admin: 08/18/19 06:23 Dose:  Not Given


Memantine (Namenda -)  5 mg PO BID [facility]


   Last Admin: 08/18/19 10:37 Dose:  5 mg


Tamsulosin HCl (Flomax -)  0.4 mg PO DAILY@0830 [facility]


   Last Admin: 08/18/19 10:38 Dose:  0.4 mg


Torsemide (Demadex -)  40 mg PO DAILY [facility]


   Last Admin: 08/18/19 10:38 Dose:  40 mg











- Objective


Vital Signs: 


 Vital Signs











Temperature  98 F   08/18/19 09:00


 


Pulse Rate  111 H  08/18/19 10:38


 


Respiratory Rate  18   08/18/19 09:00


 


Blood Pressure  127/82   08/18/19 09:00


 


O2 Sat by Pulse Oximetry (%)  96   08/18/19 09:00











Constitutional: Yes: Well Nourished, No Distress, Calm


Cardiovascular: Yes: Pulse Irregular, JVD (possible), S1, S2.  No: Gallop, 

Murmur


Respiratory: Yes: Regular, CTA Bilaterally, Diminished (R base).  No: Accessory 

Muscle Use, Rales, Wheezes


Extremities: No: Cold


Edema: No


Neurological: Yes: Alert.  No: Seizure


Psychiatric: No: Agitated


Labs: 


 CBC, BMP





 08/17/19 06:34 





 08/17/19 06:34 











Assessment/Plan





tele: AFL rates 60s-80s (transiently to 30s (slow AF) during overnight hours).








IMP:


1. Chronic Aflutter with newly rapid rates and suspected rate related 

cardiomyopathy


2. severe LV systolic dysfuntion, new (on office echo 8/19)


3. CAD s/p CABG (4V 2010: LIMA to LAD, SVG to PDA, SVG to Ramus sequential to OM

)


4. HTN, DM w/ CKD


5. Parkinson's disease








REC:


-Rates improved with addition of Dig (used for adjunct rate control as patient 

with chronic "soft" BP and prior adverse reaction to Metoprolol (dizziness) and 

refuses to take it.


-Continue Cardizem CD; as LV dyfx is suspected to be rate related, should 

improved with rate control--reasonable to continue it in this setting.


-digoxin level ok--same dose


-observe HR with ambulation


-if rate control cannot be obtained medically, will have to consider flutter 

ablation


-Continue Eliquis adjusted for age, weight and renal fx


-torsemide increased 8/16 for rales on exam: atx vs infiltrate R base on CXR. ? 

JVD on exam (uncertain), no sob or edema--continue same torsemide. monitor bmp


-Echo in office with new severe LV dysfx (suspect rate related)--to be repeated 

after period of rate control.


-Nuclear stress monday (as cause of decreased EF)

## 2019-08-19 LAB
ALBUMIN SERPL-MCNC: 3.3 G/DL (ref 3.4–5)
ALP SERPL-CCNC: 70 U/L (ref 45–117)
ALT SERPL-CCNC: 10 U/L (ref 13–61)
ANION GAP SERPL CALC-SCNC: 8 MMOL/L (ref 8–16)
AST SERPL-CCNC: 23 U/L (ref 15–37)
BASOPHILS # BLD: 0.9 % (ref 0–2)
BILIRUB SERPL-MCNC: 1 MG/DL (ref 0.2–1)
BUN SERPL-MCNC: 21 MG/DL (ref 7–18)
CALCIUM SERPL-MCNC: 8.6 MG/DL (ref 8.5–10.1)
CHLORIDE SERPL-SCNC: 105 MMOL/L (ref 98–107)
CO2 SERPL-SCNC: 29 MMOL/L (ref 21–32)
CREAT SERPL-MCNC: 1.3 MG/DL (ref 0.55–1.3)
DEPRECATED RDW RBC AUTO: 15.4 % (ref 11.9–15.9)
EOSINOPHIL # BLD: 1.5 % (ref 0–4.5)
GLUCOSE SERPL-MCNC: 94 MG/DL (ref 74–106)
HCT VFR BLD CALC: 41.1 % (ref 35.4–49)
HGB BLD-MCNC: 13.9 GM/DL (ref 11.7–16.9)
INR BLD: 1.59 (ref 0.83–1.09)
LYMPHOCYTES # BLD: 24.9 % (ref 8–40)
MAGNESIUM SERPL-MCNC: 2.7 MG/DL (ref 1.8–2.4)
MCH RBC QN AUTO: 29.6 PG (ref 25.7–33.7)
MCHC RBC AUTO-ENTMCNC: 33.7 G/DL (ref 32–35.9)
MCV RBC: 87.7 FL (ref 80–96)
MONOCYTES # BLD AUTO: 10.8 % (ref 3.8–10.2)
NEUTROPHILS # BLD: 61.9 % (ref 42.8–82.8)
PLATELET # BLD AUTO: 208 K/MM3 (ref 134–434)
PMV BLD: 8.3 FL (ref 7.5–11.1)
POTASSIUM SERPLBLD-SCNC: 3.8 MMOL/L (ref 3.5–5.1)
PROT SERPL-MCNC: 6.4 G/DL (ref 6.4–8.2)
PT PNL PPP: 18.8 SEC (ref 9.7–13)
RBC # BLD AUTO: 4.69 M/MM3 (ref 4–5.6)
SODIUM SERPL-SCNC: 142 MMOL/L (ref 136–145)
WBC # BLD AUTO: 3.9 K/MM3 (ref 4–10)

## 2019-08-19 RX ADMIN — DONEPEZIL HYDROCHLORIDE SCH MG: 10 TABLET, FILM COATED ORAL at 12:11

## 2019-08-19 RX ADMIN — INSULIN ASPART SCH: 100 INJECTION, SOLUTION INTRAVENOUS; SUBCUTANEOUS at 21:58

## 2019-08-19 RX ADMIN — INSULIN ASPART SCH: 100 INJECTION, SOLUTION INTRAVENOUS; SUBCUTANEOUS at 12:09

## 2019-08-19 RX ADMIN — ATORVASTATIN CALCIUM SCH MG: 20 TABLET, FILM COATED ORAL at 21:53

## 2019-08-19 RX ADMIN — DIGOXIN SCH MG: 125 TABLET ORAL at 12:12

## 2019-08-19 RX ADMIN — APIXABAN SCH MG: 2.5 TABLET, FILM COATED ORAL at 12:11

## 2019-08-19 RX ADMIN — MEMANTINE HYDROCHLORIDE SCH MG: 5 TABLET ORAL at 21:53

## 2019-08-19 RX ADMIN — TAMSULOSIN HYDROCHLORIDE SCH MG: 0.4 CAPSULE ORAL at 12:11

## 2019-08-19 RX ADMIN — INSULIN ASPART SCH: 100 INJECTION, SOLUTION INTRAVENOUS; SUBCUTANEOUS at 17:01

## 2019-08-19 RX ADMIN — ASPIRIN 81 MG SCH MG: 81 TABLET ORAL at 12:11

## 2019-08-19 RX ADMIN — TORSEMIDE SCH MG: 20 TABLET ORAL at 12:11

## 2019-08-19 RX ADMIN — MEMANTINE HYDROCHLORIDE SCH MG: 5 TABLET ORAL at 12:11

## 2019-08-19 RX ADMIN — INSULIN ASPART SCH: 100 INJECTION, SOLUTION INTRAVENOUS; SUBCUTANEOUS at 06:10

## 2019-08-19 RX ADMIN — APIXABAN SCH MG: 2.5 TABLET, FILM COATED ORAL at 21:53

## 2019-08-19 NOTE — PN
Progress Note, Physician


Chief Complaint: 





Sent by Dr Youngblood after seeing in office for c/o SOB & palpitations. No 

complaints offered overnight. Awaiting TTE &  Nuclear ST








History of Present Illness: 











85 yo male  with a significant PMH of AF  DM, HTN, BPH, CAD s/p CABG x 5 (2010)

,  who presents to the ED after being seen in PCP office (Dr Youngblood) with 

shortness of breath & palpitations that lasted 30 minutes, according to wife. 

Clay does have VEE & orthopnea sleeping w/ 2 pillows. Baseline leg edema 

that wifes states has been worse over last week.  Patient went to PCP office 

today, and on exam was diaphoretic, pale, & tachycardic. EMS was called for 

transfer to ED.  





- Current Medication List


Current Medications: 


Active Medications





Apixaban (Eliquis -)  2.5 mg PO BID [facility]


   Last Admin: 08/18/19 21:22 Dose:  2.5 mg


Aspirin (Asa -)  81 mg PO DAILY [facility]


   Last Admin: 08/18/19 10:38 Dose:  81 mg


Atorvastatin Calcium (Lipitor -)  20 mg PO HS [facility]


   Last Admin: 08/18/19 21:22 Dose:  20 mg


Carbidopa/Levodopa (Sinemet *Cr* 50/200 -)  1 combo PO 0700,1200,1700 [facility]


   Last Admin: 08/19/19 06:10 Dose:  Not Given


Digoxin (Lanoxin -)  0.125 mg PO DAILY [facility]


   Last Admin: 08/18/19 10:38 Dose:  0.125 mg


Diltiazem HCl (Cardizem Cd -)  120 mg PO DAILY [facility]


   Last Admin: 08/18/19 10:37 Dose:  120 mg


Donepezil HCl (Aricept -)  10 mg PO DAILY [facility]


   Last Admin: 08/18/19 10:38 Dose:  10 mg


Insulin Aspart (Novolog Vial Sliding Scale -)  [facility]; Protocol


   Last Admin: 08/19/19 06:10 Dose:  Not Given


Memantine (Namenda -)  5 mg PO BID [facility]


   Last Admin: 08/18/19 21:22 Dose:  5 mg


Tamsulosin HCl (Flomax -)  0.4 mg PO DAILY@0830 [facility]


   Last Admin: 08/18/19 10:38 Dose:  0.4 mg


Torsemide (Demadex -)  40 mg PO DAILY [facility]


   Last Admin: 08/18/19 10:38 Dose:  40 mg











- Objective


Vital Signs: 


 Vital Signs











Temperature  98.2 F   08/19/19 05:45


 


Pulse Rate  86   08/19/19 05:45


 


Respiratory Rate  20   08/19/19 05:45


 


Blood Pressure  137/75   08/19/19 05:45


 


O2 Sat by Pulse Oximetry (%)  96   08/18/19 20:53











Constitutional: Yes: Well Nourished, No Distress, Calm


Eyes: Yes: WNL, Conjunctiva Clear, EOM Intact


HENT: Yes: WNL, Atraumatic, Normocephalic


Neck: Yes: WNL, Supple, Trachea Midline


Cardiovascular: Yes: WNL, Pulse Irregular


Respiratory: Yes: WNL, Regular, CTA Bilaterally, SOB on Exertion (mild)


Gastrointestinal: Yes: WNL, Normal Bowel Sounds, Soft


...Rectal Exam: Yes: Deferred


Genitourinary: Yes: WNL


Breast(s): Yes: WNL


Musculoskeletal: Yes: WNL


Extremities: Yes: WNL


Edema: No


Peripheral Pulses WNL: Yes


Integumentary: Yes: WNL


Neurological: Yes: WNL, Alert, Oriented, Confusion (at times)


...Motor Strength: WNL, LLE, RLE (used rolling walker)


Psychiatric: Yes: WNL, Alert, Oriented


Labs: 


 CBC, BMP





 08/17/19 06:34 





 08/17/19 06:34 











- ....Imaging


Other: Pending (TTE & Nuclear ST)





Problem List





- Problems


(1) HLD (hyperlipidemia)


Assessment/Plan: 


low fat low cholesterol diet


c/w atorvastin 


Code(s): E78.5 - HYPERLIPIDEMIA, UNSPECIFIED   





(2) Atrial fibrillation with rapid ventricular response


Assessment/Plan: 


AF w/ RVR on CM and EKG on admission , now in aflutter rate controlled


c/w cardizem CD


c/w Digoxin rate controlled now


DIg level .8, maintain current dose


maintain K>4.0 , Mg >2,0


TTE/ST planned for today 





Code(s): I48.91 - UNSPECIFIED ATRIAL FIBRILLATION   





(3) CHF (congestive heart failure)


Assessment/Plan: 


EF on recent TTE 40-45%


c/w increased dose of torsemide


cardiology consultation appreciated


TTE/ Nuclear ST pending


Code(s): I50.9 - HEART FAILURE, UNSPECIFIED   





(4) Gait instability


Assessment/Plan: 


PT request placed


fall precautions


Code(s): R26.81 - UNSTEADINESS ON FEET   





(5) Localized swelling of both lower legs


Assessment/Plan: 


c/w torsemide 





Code(s): R22.43 - LOCALIZED SWELLING, MASS AND LUMP, LOWER LIMB, BILATERAL   





(6) Diabetes


Assessment/Plan: 


BGM AC/HS with novolog sliding scale


hold metformin





Code(s): E11.9 - TYPE 2 DIABETES MELLITUS WITHOUT COMPLICATIONS   





(7) HTN (hypertension)


Assessment/Plan: 


low fat low cholesterol diet


normotensive presently


c/w diltiazem


Code(s): I10 - ESSENTIAL (PRIMARY) HYPERTENSION   





(8) Parkinson disease


Assessment/Plan: 


c/w sinemet


PT following


Code(s): G20 - PARKINSON'S DISEASE   





(9) Dementia


Assessment/Plan: 


c/w namenda & aricept


hold  quetiapine, QTc 578


fall precautions


frequent orientation


encourage family visitation


Code(s): F03.90 - UNSPECIFIED DEMENTIA WITHOUT BEHAVIORAL DISTURBANCE   





(10) Prophylactic measure


Assessment/Plan: 


FEN


no need for additional IVF


cardiac/diabetic diet


monitor electrolytes





DVT


heparin sq bid





Dispo


maintain on telemetry


full code


discharge planning


Code(s): Z29.9 - ENCOUNTER FOR PROPHYLACTIC MEASURES, UNSPECIFIED   





Visit type





- Emergency Visit


Emergency Visit: Yes


ED Registration Date: 08/14/19


Care time: The patient presented to the Emergency Department on the above date 

and was hospitalized for further evaluation of their emergent condition.





- New Patient


This patient is new to me today: No





- Critical Care


Critical Care patient: No





- Discharge Referral


Referred to [facility] No

## 2019-08-19 NOTE — PN
Progress Note (short form)





- Note


Progress Note: 


no chest pain, palps, dizziness


 Current Medications





Apixaban (Eliquis -)  2.5 mg PO BID [facility]


   Last Admin: 08/19/19 12:11 Dose:  2.5 mg


Aspirin (Asa -)  81 mg PO DAILY [facility]


   Last Admin: 08/19/19 12:11 Dose:  81 mg


Atorvastatin Calcium (Lipitor -)  20 mg PO HS [facility]


   Last Admin: 08/18/19 21:22 Dose:  20 mg


Carbidopa/Levodopa (Sinemet *Cr* 50/200 -)  1 combo PO 0700,1200,1700 [facility]


   Last Admin: 08/19/19 12:22 Dose:  1 combo


Digoxin (Lanoxin -)  0.125 mg PO DAILY [facility]


   Last Admin: 08/19/19 12:12 Dose:  0.125 mg


Diltiazem HCl (Cardizem Cd -)  120 mg PO DAILY [facility]


   Last Admin: 08/19/19 12:11 Dose:  120 mg


Donepezil HCl (Aricept -)  10 mg PO DAILY [facility]


   Last Admin: 08/19/19 12:11 Dose:  10 mg


Insulin Aspart (Novolog Vial Sliding Scale -)  [facility]; Protocol


   Last Admin: 08/19/19 12:09 Dose:  Not Given


Memantine (Namenda -)  5 mg PO BID [facility]


   Last Admin: 08/19/19 12:11 Dose:  5 mg


Tamsulosin HCl (Flomax -)  0.4 mg PO DAILY@0830 [facility]


   Last Admin: 08/19/19 12:11 Dose:  0.4 mg


Torsemide (Demadex -)  40 mg PO DAILY [facility]


   Last Admin: 08/19/19 12:11 Dose:  40 mg





 Vital Signs











 Period  Temp  Pulse  Resp  BP Sys/Lowe  Pulse Ox


 


 Last 24 Hr  97.5 F-98.3 F  83-88  18-20  117-137/51-88  96-96














Constitutional: Yes: Well Nourished, No Distress, Calm


Cardiovascular: Yes: Pulse Irregular, JVD (possible), S1, S2.  No: Gallop, 

Murmur


Respiratory: Yes: Regular, CTA Bilaterally, Diminished (R base).  No: Accessory 

Muscle Use, Rales, Wheezes


Extremities: No: Cold


Edema: No


Neurological: Yes: Alert.  No: Seizure


Psychiatric: No: Agitated











Assessment/Plan





tele: AFL rates 60s-80s (transiently to 30s (slow AF) during overnight hours, 

episodes of HR 100s).  





tele alarms 170s-180s due to artifact, strips reviewed








IMP:


1. Chronic Aflutter with newly rapid rates and suspected rate related 

cardiomyopathy


2. severe LV systolic dysfuntion, new (on office echo 8/19)


3. CAD s/p CABG (4V 2010: LIMA to LAD, SVG to PDA, SVG to Ramus sequential to OM

)


4. HTN, DM w/ CKD


5. Parkinson's disease








REC:


-Rates improved with addition of Dig (used for adjunct rate control as patient 

with chronic "soft" BP and prior adverse reaction to Metoprolol (dizziness) and 

refuses to take it.


-Continue Cardizem CD; as LV dyfx is suspected to be rate related, should 

improved with rate control--reasonable to continue it in this setting.


-digoxin level ok--same dose


-observe HR with ambulation


-if rate control cannot be obtained medically, will have to consider flutter 

ablation - stable for now


-Continue Eliquis adjusted for age, weight and renal fx


-torsemide increased 8/16 for rales on exam: atx vs infiltrate R base on CXR. ? 

JVD on exam (uncertain), no sob or edema--continue same torsemide. monitor bmp


-Echo in office with new severe LV dysfx (suspect rate related)--to be repeated 

after period of rate control.


-Nuclear stress shows cardiomyopathy, no ischemia

## 2019-08-19 NOTE — ECHO
______________________________________________________________________________



Name: RITU OBNNER                                    Exam:Adult Echocardiogram

MRN: [id number]         Study Date: 08/15/2019 08:52 AM

Age: 84 yrs

______________________________________________________________________________



Height: 67 in

______________________________________________________________________________





MMode/2D Measurements & Calculations

IVSd: 1.0 cm                                     Ao root diam: 3.2 cm

LVIDd: 5.4 cm                                    LA dimension: 4.9 cm

LVIDs: 4.1 cm

LVPWd: 1.2 cm

__________________________________________________



LVPWs: 1.7 cm                                    EDV(Teich): 142.4 ml

                                                 ESV(Teich): 74.1 ml

__________________________________________________



LVOT diam: 2.2 cm                                RV S Arturo: 12.4 cm/sec



Doppler Measurements & Calculations

Ao V2 max: 120.8 cm/sec                                    LV V1 max P.2 mmHg

Ao max P.9 mmHg                                        LV V1 mean P.3 mmHg

Ao V2 mean: 88.4 cm/sec                                    LV V1 max: 73.7 cm/sec

Ao mean PG: 3.5 mmHg                                       LV V1 mean: 54.0 cm/sec

Ao V2 VTI: 21.1 cm                                         LV V1 VTI: 13.3 cm

DOROTEO(I,D): 2.3 cm2



DOROTEO(V,D): 2.3 cm2

____________________________________________________________

MR max arturo: 420.0 cm/sec                                   SV(LVOT): 49.3 ml

MR max P.6 mmHg



____________________________________________________________

TR max arturo: 271.4 cm/sec                                   PA V2 max: 100.4 cm/sec

TR max P.5 mmHg                                       PA max P.1 mmHg

RVSP(TR): 39.5 mmHg



____________________________________________________________

Med Peak E' Arturo: 5.0 cm/sec                                RAP systole: 10.0 mmHg

Lat Peak E' Arturo: 6.9 cm/sec





______________________________________________________________________________

Left Ventricle

Left ventricular systolic function is severely reduced. Ejection Fraction = 25-30%. There is severe g
lobal

hypokinesis of the left ventricle.

Right Ventricle

The right ventricle is grossly normal size. The right ventricular systolic function is moderate to se
verely

reduced.

Atria

The left atrium is mildly dilated. Right atrial size is normal.

Mitral Valve

There is mild mitral valve thickening. There is no mitral valve stenosis. There is mild mitral regurg
itation.

Tricuspid Valve

The tricuspid valve is not well visualized, but is grossly normal. There is mild tricuspid regurgitat
ion.

Right ventricular systolic pressure is elevated at 30-40mmHg.

Aortic Valve

There is mild aortic sclerosis.;. No hemodynamically significant valvular aortic stenosis. Mild aorti
c

regurgitation.

Pulmonic Valve

The pulmonic valve is not well seen, but is grossly normal. There is no pulmonic valvular stenosis. M
ild

pulmonic valvular regurgitation.

Great Vessels

The aortic root is normal size.

Pericardium/Pleura

There is no pericardial effusion.



______________________________________________________________________________



Interpretation Summary

There is severe global hypokinesis of the left ventricle.

Left ventricular systolic function is severely reduced.

Ejection Fraction = 25-30%.

The right ventricular systolic function is moderate to severely reduced.

The left atrium is mildly dilated.

There is mild mitral regurgitation.

There is mild tricuspid regurgitation.

Right ventricular systolic pressure is elevated at 30-40mmHg.

There is mild aortic sclerosis.;

Mild aortic regurgitation.

There is no pericardial effusion.







MD Tucker *Stephane 08/15/2019 10:23 AM

## 2019-08-20 VITALS — TEMPERATURE: 98.3 F | DIASTOLIC BLOOD PRESSURE: 74 MMHG | SYSTOLIC BLOOD PRESSURE: 132 MMHG

## 2019-08-20 VITALS — HEART RATE: 65 BPM

## 2019-08-20 LAB
ALBUMIN SERPL-MCNC: 3.2 G/DL (ref 3.4–5)
ALP SERPL-CCNC: 69 U/L (ref 45–117)
ALT SERPL-CCNC: 8 U/L (ref 13–61)
ANION GAP SERPL CALC-SCNC: 6 MMOL/L (ref 8–16)
AST SERPL-CCNC: 19 U/L (ref 15–37)
BILIRUB SERPL-MCNC: 0.8 MG/DL (ref 0.2–1)
BUN SERPL-MCNC: 18.6 MG/DL (ref 7–18)
CALCIUM SERPL-MCNC: 8.8 MG/DL (ref 8.5–10.1)
CHLORIDE SERPL-SCNC: 107 MMOL/L (ref 98–107)
CO2 SERPL-SCNC: 30 MMOL/L (ref 21–32)
CREAT SERPL-MCNC: 1.1 MG/DL (ref 0.55–1.3)
DEPRECATED RDW RBC AUTO: 15.9 % (ref 11.9–15.9)
GLUCOSE SERPL-MCNC: 108 MG/DL (ref 74–106)
HCT VFR BLD CALC: 43.4 % (ref 35.4–49)
HGB BLD-MCNC: 14.4 GM/DL (ref 11.7–16.9)
MAGNESIUM SERPL-MCNC: 2.6 MG/DL (ref 1.8–2.4)
MCH RBC QN AUTO: 29.4 PG (ref 25.7–33.7)
MCHC RBC AUTO-ENTMCNC: 33.1 G/DL (ref 32–35.9)
MCV RBC: 88.7 FL (ref 80–96)
PLATELET # BLD AUTO: 218 K/MM3 (ref 134–434)
PLATELET BLD QL SMEAR: ADEQUATE
PMV BLD: 8.1 FL (ref 7.5–11.1)
POTASSIUM SERPLBLD-SCNC: 3.8 MMOL/L (ref 3.5–5.1)
PROT SERPL-MCNC: 6.2 G/DL (ref 6.4–8.2)
RBC # BLD AUTO: 4.89 M/MM3 (ref 4–5.6)
SODIUM SERPL-SCNC: 143 MMOL/L (ref 136–145)
WBC # BLD AUTO: 4.2 K/MM3 (ref 4–10)

## 2019-08-20 RX ADMIN — DONEPEZIL HYDROCHLORIDE SCH MG: 10 TABLET, FILM COATED ORAL at 09:02

## 2019-08-20 RX ADMIN — INSULIN ASPART SCH: 100 INJECTION, SOLUTION INTRAVENOUS; SUBCUTANEOUS at 13:26

## 2019-08-20 RX ADMIN — APIXABAN SCH MG: 2.5 TABLET, FILM COATED ORAL at 09:03

## 2019-08-20 RX ADMIN — INSULIN ASPART SCH: 100 INJECTION, SOLUTION INTRAVENOUS; SUBCUTANEOUS at 17:05

## 2019-08-20 RX ADMIN — INSULIN ASPART SCH: 100 INJECTION, SOLUTION INTRAVENOUS; SUBCUTANEOUS at 06:06

## 2019-08-20 RX ADMIN — MEMANTINE HYDROCHLORIDE SCH MG: 5 TABLET ORAL at 09:02

## 2019-08-20 RX ADMIN — DIGOXIN SCH MG: 125 TABLET ORAL at 09:02

## 2019-08-20 RX ADMIN — TAMSULOSIN HYDROCHLORIDE SCH MG: 0.4 CAPSULE ORAL at 09:02

## 2019-08-20 RX ADMIN — ASPIRIN 81 MG SCH MG: 81 TABLET ORAL at 09:03

## 2019-08-20 RX ADMIN — TORSEMIDE SCH MG: 20 TABLET ORAL at 09:03

## 2019-08-20 NOTE — PN
Progress Note (short form)





- Note


Progress Note: 


no chest pain, palps, dizziness


 


 Current Medications





Apixaban (Eliquis -)  2.5 mg PO BID [facility]


   Last Admin: 08/20/19 09:03 Dose:  2.5 mg


Aspirin (Asa -)  81 mg PO DAILY [facility]


   Last Admin: 08/20/19 09:03 Dose:  81 mg


Atorvastatin Calcium (Lipitor -)  20 mg PO HS [facility]


   Last Admin: 08/19/19 21:53 Dose:  20 mg


Carbidopa/Levodopa (Sinemet *Cr* 50/200 -)  1 combo PO 0700,1200,1700 [facility]


   Last Admin: 08/20/19 06:06 Dose:  1 combo


Digoxin (Lanoxin -)  0.125 mg PO DAILY [facility]


   Last Admin: 08/20/19 09:02 Dose:  0.125 mg


Diltiazem HCl (Cardizem Cd -)  120 mg PO DAILY [facility]


   Last Admin: 08/20/19 09:02 Dose:  120 mg


Donepezil HCl (Aricept -)  10 mg PO DAILY [facility]


   Last Admin: 08/20/19 09:02 Dose:  10 mg


Insulin Aspart (Novolog Vial Sliding Scale -)  [facility]; Protocol


   Last Admin: 08/20/19 06:06 Dose:  Not Given


Memantine (Namenda -)  5 mg PO BID [facility]


   Last Admin: 08/20/19 09:02 Dose:  5 mg


Tamsulosin HCl (Flomax -)  0.4 mg PO DAILY@0830 [facility]


   Last Admin: 08/20/19 09:02 Dose:  0.4 mg


Torsemide (Demadex -)  40 mg PO DAILY [facility]


   Last Admin: 08/20/19 09:03 Dose:  40 mg


 Vital Signs











 Period  Temp  Pulse  Resp  BP Sys/Lowe  Pulse Ox


 


 Last 24 Hr  97.5 F-98.1 F  65-91  20-20  102-122/62-71  97











Constitutional: Yes: Well Nourished, No Distress, Calm


Cardiovascular: Yes: Pulse Irregular, JVD (possible), S1, S2.  No: Gallop, 

Murmur


Respiratory: Yes: Regular, CTA Bilaterally, Diminished (R base).  No: Accessory 

Muscle Use, Rales, Wheezes


Extremities: No: Cold


Edema: No


Neurological: Yes: Alert.  No: Seizure


Psychiatric: No: Agitated











Assessment/Plan





tele: AFL rates 60s-80s (transiently to 30s (slow AF) during overnight hours, 

episodes of HR 100s).  





tele alarms 170s-180s due to artifact (reading flutter waves as QRS complex, 

strips reviewed








IMP:


1. Chronic Aflutter with newly rapid rates and suspected rate related 

cardiomyopathy


2. severe LV systolic dysfuntion, new (on office echo 8/19)


3. CAD s/p CABG (4V 2010: LIMA to LAD, SVG to PDA, SVG to Ramus sequential to OM

)


4. HTN, DM w/ CKD


5. Parkinson's disease








REC:


-Rates improved with addition of Dig (used for adjunct rate control as patient 

with chronic "soft" BP and prior adverse reaction to Metoprolol (dizziness) and 

refuses to take it.


-Continue Cardizem CD; as LV dyfx is suspected to be rate related, should 

improved with rate control--reasonable to continue it in this setting.


-digoxin level ok--same dose


-observe HR with ambulation


-if rate control cannot be obtained medically, will have to consider flutter 

ablation - stable for now


-Continue Eliquis adjusted for age, weight and renal fx


-torsemide increased 8/16 for rales on exam: atx vs infiltrate R base on CXR. ? 

JVD on exam (uncertain), no sob or edema--continue same torsemide. monitor bmp


-Echo in office with new severe LV dysfx (suspect rate related)--to be repeated 

after period of rate control.


-Nuclear stress shows cardiomyopathy, no ischemia





continue torsemide, digoxin, cardizem at current doses. stable for dc from 

cardiac perspective

## 2019-08-20 NOTE — DS
Physical Exam: 


SUBJECTIVE: 





Patient seen and examined at the bedside.  In no acute distress. feels well.   

tolerating room air.  denies chest  pain.





OBJECTIVE:


cleared for d/c home by cardiology


 Vital Signs











 Period  Temp  Pulse  Resp  BP Sys/Lowe  Pulse Ox


 


 Last 24 Hr  97.5 F-98.3 F  65-91  20-20  102-132/62-74  97-97








PHYSICAL EXAM





GENERAL: The patient is awake, alert, in no acute distress.


HEAD: Normal with no signs of trauma.


EYES: PERRL, extraocular movements intact, sclera anicteric, conjunctiva clear. 


ENT: Ears normal, nares patent, oropharynx clear without exudates, moist mucous 

membranes.


NECK: Trachea midline


LUNGS: diminished at the bases, tolerating room air


ABDOMEN: Soft, nontender, nondistended 


EXTREMITIES:  no edema. 


NEUROLOGICAL: Normal speech, gait not observed.


PSYCH: Normal mood, normal affect.


SKIN: Warm, dry, normal turgor, no rashes or lesions noted.





LABS


 Laboratory Results - last 24 hr











  08/19/19 08/19/19 08/20/19





  16:54 21:56 05:34


 


WBC   


 


RBC   


 


Hgb   


 


Hct   


 


MCV   


 


MCH   


 


MCHC   


 


RDW   


 


Plt Count   


 


MPV   


 


Absolute Neuts (auto)   


 


Neutrophils %   


 


Lymphocytes %   


 


Nucleated RBC %   


 


Sodium   


 


Potassium   


 


Chloride   


 


Carbon Dioxide   


 


Anion Gap   


 


BUN   


 


Creatinine   


 


Est GFR (CKD-EPI)AfAm   


 


Est GFR (CKD-EPI)NonAf   


 


POC Glucometer  115  121  121


 


Random Glucose   


 


Calcium   


 


Magnesium   


 


Total Bilirubin   


 


AST   


 


ALT   


 


Alkaline Phosphatase   


 


Total Protein   


 


Albumin   














  08/20/19 08/20/19 08/20/19





  09:25 09:25 13:25


 


WBC  4.2  


 


RBC  4.89  


 


Hgb  14.4  


 


Hct  43.4  


 


MCV  88.7  


 


MCH  29.4  


 


MCHC  33.1  


 


RDW  15.9  


 


Plt Count  218  


 


MPV  8.1  


 


Absolute Neuts (auto)  2.4  


 


Neutrophils %  No Result Required.  


 


Lymphocytes %  No Result Required.  


 


Nucleated RBC %  0  


 


Sodium   143 


 


Potassium   3.8 


 


Chloride   107 


 


Carbon Dioxide   30 


 


Anion Gap   6 L 


 


BUN   18.6 H 


 


Creatinine   1.1 


 


Est GFR (CKD-EPI)AfAm   72.07 


 


Est GFR (CKD-EPI)NonAf   62.19 


 


POC Glucometer    174


 


Random Glucose   108 H 


 


Calcium   8.8 


 


Magnesium   2.6 H 


 


Total Bilirubin   0.8 


 


AST   19 


 


ALT   8 L 


 


Alkaline Phosphatase   69 


 


Total Protein   6.2 L 


 


Albumin   3.2 L 











HOSPITAL COURSE:





Date of Admission:08/14/19





Date of Discharge: 08/20/19





Patient is an 84 year old male with a significant past medical history of 

diabetes, BPH, atrial fibrilation, hypertension, CAD s/p CABG x 5 (2010).  He 

presented to the ED on 8/14/2019 after being seen by his PCP (Dr Youngblood) 

with shortness of breath & palpitations that lasted 30 minutes. He was noted 

then to be  diaphoretic, pale, & tachycardic. EMS was called for transfer to 

ED.  





He has been followed by cardiology since admission and will be discharged home 

today.





HOSPITAL COURSE BY PROBLEM LIST:





Atrial fibrillation with RVR


AF wit RVR on admssion, now rate controlled


continue cardizem 120mg daily


Continue digoxin 0.125mg daily (dig level 8)


Maintain K 4.0, mag 2.0>





Hyperlipidemia


On low cholesterol diet.  continue with statin therapy





Congestive Heart Failure


EF on recent TTE 40-45%


Torsemide increased


Nuclear stress shows cardiomyopathy, no ischemia


continue torsemide, digoxin, cardizem 


Cardiology follow up as an outpatient.





Diabetes


continue metformin





Hypertension


BP controlled





Parkinsons disease


on Carbidopa/Levodopa





Dementia


On Aricept








Minutes to complete discharge: 60





Discharge Summary


Reason For Visit: CONGESTIVE HEART FAILURE,ATRIAL FILBRILLATION WITH


Current Active Problems





Atrial fibrillation with rapid ventricular response (Acute)


CHF (congestive heart failure) (Acute)


Dementia (Acute)


HLD (hyperlipidemia) (Acute)


Parkinson disease (Acute)


Prophylactic measure (Acute)








Condition: Improved





- Instructions


Diet, Activity, Other Instructions: 


Mr Davies:





You were admitted for congestive heart failure and atrial fibrillation with 

rapid rate.  We will be sending you home with the following home medication 

recommendations.





Start on Digixon 0.125mg once per day, this medication will help control your 

rate so that your heart does not beat too fast.


Continue Cardizem CD 120mg once per day


Continue Eliquis 2.5mg TWICE per day.


Your Torsemide has been increased to Torsemide 40mg once per day (was torsemide 

20mg) .





You will need to follow up with Dr. Richards for follow up and close 

monitoring of your kidney function since you are on a higher dose of 

Toresemide.  Continue the medications as outlined in your discharge 

instructions.  





Thank you for allowing us to care for you


Referrals: 


Pito Brooks MD [Primary Care Provider] - 1 Week


Tamar Mantilla MD [Staff Physician] - 


Disposition: HOME





- Home Medications


Comprehensive Discharge Medication List: 


Ambulatory Orders





Potassium Chloride 20 meq PO DAILY 04/15/15 


Tamsulosin HCl 0.4 mg PO DAILY 04/15/15 


Aspirin 81 mg PO DAILY 03/01/18 


Atorvastatin Ca [Lipitor] 20 mg PO DAILY 03/01/18 


metFORMIN HCL [Metformin HCl] 500 mg PO BID 03/01/18 


Donepezil HCl [Aricept] 10 mg PO DAILY 03/02/18 


Carbidopa/Levodopa *Cr* 50/200 [Sinemet *Cr* 50/200 -] 1 combo PO 0700,1200,

1700 #90 tablet.er 04/19/18 


Apixaban [Eliquis -] 2.5 mg PO BID 08/14/19 


Cholecalciferol (Vitamin D3) [Vitamin D -] 50,000 unit PO WEEKLY 08/14/19 


Diltiazem HCl [Cartia Xt] 120 mg PO DAILY 08/14/19 


Memantine HCl [Namenda -] 5 mg PO BID 08/14/19 


Quetiapine Fumarate [Seroquel -] 25 mg PO HS 08/14/19 


Apixaban [Eliquis] 2.5 mg PO BID #60 tablet 08/20/19 


Digoxin [Lanoxin -] 0.125 mg PO DAILY #60 tablet 08/20/19 


Torsemide [Demadex -] 40 mg PO DAILY #60 tablet 08/20/19 








This patient is new to me today: Yes


Date on this admission: 08/20/19


Emergency Visit: Yes


ED Registration Date: 08/14/19


Care time: The patient presented to the Emergency Department on the above date 

and was hospitalized for further evaluation of their emergent condition.


Critical Care patient: No





- Discharge Referral


Referred to [facility] No